# Patient Record
Sex: FEMALE | Race: WHITE | Employment: FULL TIME | URBAN - METROPOLITAN AREA
[De-identification: names, ages, dates, MRNs, and addresses within clinical notes are randomized per-mention and may not be internally consistent; named-entity substitution may affect disease eponyms.]

---

## 2019-03-24 ENCOUNTER — APPOINTMENT (OUTPATIENT)
Dept: CT IMAGING | Age: 34
End: 2019-03-24
Payer: COMMERCIAL

## 2019-03-24 ENCOUNTER — HOSPITAL ENCOUNTER (EMERGENCY)
Age: 34
Discharge: HOME | End: 2019-03-24
Payer: COMMERCIAL

## 2019-03-24 VITALS
WEIGHT: 127 LBS | HEIGHT: 63 IN | TEMPERATURE: 97.8 F | HEART RATE: 89 BPM | BODY MASS INDEX: 22.5 KG/M2 | RESPIRATION RATE: 17 BRPM | DIASTOLIC BLOOD PRESSURE: 80 MMHG | SYSTOLIC BLOOD PRESSURE: 132 MMHG | OXYGEN SATURATION: 100 %

## 2019-03-24 DIAGNOSIS — R10.12 ABDOMINAL PAIN, LEFT UPPER QUADRANT: Primary | ICD-10-CM

## 2019-03-24 LAB
A/G RATIO: 1.2 (ref 1.1–2.2)
ALBUMIN SERPL-MCNC: 4.3 G/DL (ref 3.4–5)
ALP BLD-CCNC: 44 U/L (ref 40–129)
ALT SERPL-CCNC: 20 U/L (ref 10–40)
ANION GAP SERPL CALCULATED.3IONS-SCNC: 11 MMOL/L (ref 3–16)
AST SERPL-CCNC: 22 U/L (ref 15–37)
BACTERIA: ABNORMAL /HPF
BASOPHILS ABSOLUTE: 0 K/UL (ref 0–0.2)
BASOPHILS RELATIVE PERCENT: 0.4 %
BILIRUB SERPL-MCNC: 0.5 MG/DL (ref 0–1)
BILIRUBIN URINE: NEGATIVE
BLOOD, URINE: NEGATIVE
BUN BLDV-MCNC: 10 MG/DL (ref 7–20)
CALCIUM SERPL-MCNC: 9 MG/DL (ref 8.3–10.6)
CHLORIDE BLD-SCNC: 100 MMOL/L (ref 99–110)
CLARITY: CLEAR
CO2: 23 MMOL/L (ref 21–32)
COLOR: YELLOW
CREAT SERPL-MCNC: 0.8 MG/DL (ref 0.6–1.1)
EOSINOPHILS ABSOLUTE: 0 K/UL (ref 0–0.6)
EOSINOPHILS RELATIVE PERCENT: 0.6 %
EPITHELIAL CELLS, UA: ABNORMAL /HPF
GFR AFRICAN AMERICAN: >60
GFR NON-AFRICAN AMERICAN: >60
GLOBULIN: 3.5 G/DL
GLUCOSE BLD-MCNC: 77 MG/DL (ref 70–99)
GLUCOSE URINE: NEGATIVE MG/DL
HCG(URINE) PREGNANCY TEST: NEGATIVE
HCT VFR BLD CALC: 40.6 % (ref 36–48)
HEMOGLOBIN: 13.5 G/DL (ref 12–16)
KETONES, URINE: NEGATIVE MG/DL
LEUKOCYTE ESTERASE, URINE: ABNORMAL
LYMPHOCYTES ABSOLUTE: 2.3 K/UL (ref 1–5.1)
LYMPHOCYTES RELATIVE PERCENT: 42.1 %
MCH RBC QN AUTO: 27.2 PG (ref 26–34)
MCHC RBC AUTO-ENTMCNC: 33.2 G/DL (ref 31–36)
MCV RBC AUTO: 82.1 FL (ref 80–100)
MICROSCOPIC EXAMINATION: YES
MONOCYTES ABSOLUTE: 0.3 K/UL (ref 0–1.3)
MONOCYTES RELATIVE PERCENT: 6.1 %
NEUTROPHILS ABSOLUTE: 2.7 K/UL (ref 1.7–7.7)
NEUTROPHILS RELATIVE PERCENT: 50.8 %
NITRITE, URINE: NEGATIVE
PDW BLD-RTO: 16 % (ref 12.4–15.4)
PH UA: 7 (ref 5–8)
PLATELET # BLD: 263 K/UL (ref 135–450)
PMV BLD AUTO: 7.4 FL (ref 5–10.5)
POTASSIUM REFLEX MAGNESIUM: 4 MMOL/L (ref 3.5–5.1)
PROTEIN UA: NEGATIVE MG/DL
RBC # BLD: 4.94 M/UL (ref 4–5.2)
RBC UA: ABNORMAL /HPF (ref 0–2)
SODIUM BLD-SCNC: 134 MMOL/L (ref 136–145)
SPECIFIC GRAVITY UA: 1.01 (ref 1–1.03)
TOTAL PROTEIN: 7.8 G/DL (ref 6.4–8.2)
URINE REFLEX TO CULTURE: YES
URINE TYPE: ABNORMAL
UROBILINOGEN, URINE: 0.2 E.U./DL
WBC # BLD: 5.4 K/UL (ref 4–11)
WBC UA: ABNORMAL /HPF (ref 0–5)

## 2019-03-24 PROCEDURE — 85025 COMPLETE CBC W/AUTO DIFF WBC: CPT

## 2019-03-24 PROCEDURE — 87086 URINE CULTURE/COLONY COUNT: CPT

## 2019-03-24 PROCEDURE — 99284 EMERGENCY DEPT VISIT MOD MDM: CPT

## 2019-03-24 PROCEDURE — 80053 COMPREHEN METABOLIC PANEL: CPT

## 2019-03-24 PROCEDURE — 74177 CT ABD & PELVIS W/CONTRAST: CPT

## 2019-03-24 PROCEDURE — 84703 CHORIONIC GONADOTROPIN ASSAY: CPT

## 2019-03-24 PROCEDURE — 81001 URINALYSIS AUTO W/SCOPE: CPT

## 2019-03-24 PROCEDURE — 6360000004 HC RX CONTRAST MEDICATION: Performed by: PHYSICIAN ASSISTANT

## 2019-03-24 RX ORDER — IBUPROFEN 600 MG/1
600 TABLET ORAL EVERY 8 HOURS PRN
Qty: 20 TABLET | Refills: 0 | Status: SHIPPED | OUTPATIENT
Start: 2019-03-24 | End: 2020-01-20

## 2019-03-24 RX ADMIN — IOPAMIDOL 75 ML: 755 INJECTION, SOLUTION INTRAVENOUS at 17:35

## 2019-03-24 ASSESSMENT — PAIN DESCRIPTION - ORIENTATION: ORIENTATION: LEFT

## 2019-03-24 ASSESSMENT — PAIN DESCRIPTION - PAIN TYPE: TYPE: ACUTE PAIN

## 2019-03-24 ASSESSMENT — PAIN SCALES - GENERAL: PAINLEVEL_OUTOF10: 5

## 2019-03-24 ASSESSMENT — PAIN DESCRIPTION - LOCATION: LOCATION: RIB CAGE

## 2019-03-24 ASSESSMENT — PAIN DESCRIPTION - DESCRIPTORS: DESCRIPTORS: STABBING

## 2019-03-26 LAB — URINE CULTURE, ROUTINE: NORMAL

## 2020-01-20 ENCOUNTER — HOSPITAL ENCOUNTER (EMERGENCY)
Age: 35
Discharge: HOME | End: 2020-01-20
Attending: FAMILY MEDICINE

## 2020-01-20 VITALS
DIASTOLIC BLOOD PRESSURE: 81 MMHG | HEART RATE: 94 BPM | HEIGHT: 63 IN | TEMPERATURE: 99.1 F | OXYGEN SATURATION: 100 % | BODY MASS INDEX: 23.04 KG/M2 | SYSTOLIC BLOOD PRESSURE: 100 MMHG | RESPIRATION RATE: 18 BRPM | WEIGHT: 130 LBS

## 2020-01-20 LAB
A/G RATIO: 1.7 (ref 1.1–2.2)
ALBUMIN SERPL-MCNC: 3.7 G/DL (ref 3.4–5)
ALP BLD-CCNC: 34 U/L (ref 40–129)
ALT SERPL-CCNC: 13 U/L (ref 10–40)
AMORPHOUS: ABNORMAL /HPF
ANION GAP SERPL CALCULATED.3IONS-SCNC: 8 MMOL/L (ref 3–16)
AST SERPL-CCNC: 13 U/L (ref 15–37)
BACTERIA: ABNORMAL /HPF
BASOPHILS ABSOLUTE: 0 K/UL (ref 0–0.2)
BASOPHILS RELATIVE PERCENT: 0.2 %
BILIRUB SERPL-MCNC: 0.6 MG/DL (ref 0–1)
BILIRUBIN URINE: NEGATIVE
BLOOD, URINE: NEGATIVE
BUN BLDV-MCNC: 7 MG/DL (ref 7–20)
CALCIUM SERPL-MCNC: 8.2 MG/DL (ref 8.3–10.6)
CHLORIDE BLD-SCNC: 104 MMOL/L (ref 99–110)
CLARITY: CLEAR
CO2: 19 MMOL/L (ref 21–32)
COLOR: YELLOW
CREAT SERPL-MCNC: 0.6 MG/DL (ref 0.6–1.1)
EOSINOPHILS ABSOLUTE: 0 K/UL (ref 0–0.6)
EOSINOPHILS RELATIVE PERCENT: 0 %
EPITHELIAL CELLS, UA: ABNORMAL /HPF
GFR AFRICAN AMERICAN: >60
GFR NON-AFRICAN AMERICAN: >60
GLOBULIN: 2.2 G/DL
GLUCOSE BLD-MCNC: 99 MG/DL (ref 70–99)
GLUCOSE URINE: NEGATIVE MG/DL
HCT VFR BLD CALC: 42.4 % (ref 36–48)
HEMOGLOBIN: 14.2 G/DL (ref 12–16)
KETONES, URINE: 40 MG/DL
LEUKOCYTE ESTERASE, URINE: ABNORMAL
LYMPHOCYTES ABSOLUTE: 0.6 K/UL (ref 1–5.1)
LYMPHOCYTES RELATIVE PERCENT: 8.2 %
MCH RBC QN AUTO: 29 PG (ref 26–34)
MCHC RBC AUTO-ENTMCNC: 33.5 G/DL (ref 31–36)
MCV RBC AUTO: 86.5 FL (ref 80–100)
MICROSCOPIC EXAMINATION: YES
MONOCYTES ABSOLUTE: 0.2 K/UL (ref 0–1.3)
MONOCYTES RELATIVE PERCENT: 3 %
NEUTROPHILS ABSOLUTE: 6.7 K/UL (ref 1.7–7.7)
NEUTROPHILS RELATIVE PERCENT: 88.6 %
NITRITE, URINE: NEGATIVE
PDW BLD-RTO: 14 % (ref 12.4–15.4)
PH UA: 5.5 (ref 5–8)
PLATELET # BLD: 228 K/UL (ref 135–450)
PLATELET SLIDE REVIEW: ADEQUATE
PMV BLD AUTO: 8.1 FL (ref 5–10.5)
POTASSIUM SERPL-SCNC: 3.8 MMOL/L (ref 3.5–5.1)
PROTEIN UA: NEGATIVE MG/DL
RAPID INFLUENZA  B AGN: NEGATIVE
RAPID INFLUENZA A AGN: NEGATIVE
RBC # BLD: 4.9 M/UL (ref 4–5.2)
RBC UA: ABNORMAL /HPF (ref 0–2)
SLIDE REVIEW: ABNORMAL
SODIUM BLD-SCNC: 131 MMOL/L (ref 136–145)
SPECIFIC GRAVITY UA: 1.01 (ref 1–1.03)
TOTAL PROTEIN: 5.9 G/DL (ref 6.4–8.2)
URINE REFLEX TO CULTURE: YES
URINE TYPE: ABNORMAL
UROBILINOGEN, URINE: 0.2 E.U./DL
WBC # BLD: 7.5 K/UL (ref 4–11)
WBC UA: ABNORMAL /HPF (ref 0–5)

## 2020-01-20 PROCEDURE — 96365 THER/PROPH/DIAG IV INF INIT: CPT

## 2020-01-20 PROCEDURE — 81001 URINALYSIS AUTO W/SCOPE: CPT

## 2020-01-20 PROCEDURE — 87804 INFLUENZA ASSAY W/OPTIC: CPT

## 2020-01-20 PROCEDURE — 87086 URINE CULTURE/COLONY COUNT: CPT

## 2020-01-20 PROCEDURE — 80053 COMPREHEN METABOLIC PANEL: CPT

## 2020-01-20 PROCEDURE — 96375 TX/PRO/DX INJ NEW DRUG ADDON: CPT

## 2020-01-20 PROCEDURE — 2580000003 HC RX 258: Performed by: FAMILY MEDICINE

## 2020-01-20 PROCEDURE — 6360000002 HC RX W HCPCS: Performed by: FAMILY MEDICINE

## 2020-01-20 PROCEDURE — 99283 EMERGENCY DEPT VISIT LOW MDM: CPT

## 2020-01-20 PROCEDURE — 85025 COMPLETE CBC W/AUTO DIFF WBC: CPT

## 2020-01-20 RX ORDER — ONDANSETRON 2 MG/ML
8 INJECTION INTRAMUSCULAR; INTRAVENOUS ONCE
Status: COMPLETED | OUTPATIENT
Start: 2020-01-20 | End: 2020-01-20

## 2020-01-20 RX ORDER — 0.9 % SODIUM CHLORIDE 0.9 %
1000 INTRAVENOUS SOLUTION INTRAVENOUS ONCE
Status: COMPLETED | OUTPATIENT
Start: 2020-01-20 | End: 2020-01-20

## 2020-01-20 RX ORDER — DEXTROSE AND SODIUM CHLORIDE 5; .9 G/100ML; G/100ML
1000 INJECTION, SOLUTION INTRAVENOUS ONCE
Status: COMPLETED | OUTPATIENT
Start: 2020-01-20 | End: 2020-01-20

## 2020-01-20 RX ORDER — ONDANSETRON 4 MG/1
4 TABLET, FILM COATED ORAL 3 TIMES DAILY PRN
Qty: 15 TABLET | Refills: 0 | Status: SHIPPED | OUTPATIENT
Start: 2020-01-20 | End: 2021-12-17

## 2020-01-20 RX ADMIN — DEXTROSE AND SODIUM CHLORIDE 1000 ML: 5; 900 INJECTION, SOLUTION INTRAVENOUS at 02:44

## 2020-01-20 RX ADMIN — SODIUM CHLORIDE 1000 ML: 9 INJECTION, SOLUTION INTRAVENOUS at 01:08

## 2020-01-20 RX ADMIN — ONDANSETRON HYDROCHLORIDE 8 MG: 2 INJECTION, SOLUTION INTRAMUSCULAR; INTRAVENOUS at 01:08

## 2020-01-20 NOTE — ED PROVIDER NOTES
Rash       SOCIAL AND FAMILY HISTORY    Social History     Socioeconomic History    Marital status: Single     Spouse name: None    Number of children: None    Years of education: None    Highest education level: None   Occupational History    None   Social Needs    Financial resource strain: None    Food insecurity:     Worry: None     Inability: None    Transportation needs:     Medical: None     Non-medical: None   Tobacco Use    Smoking status: Never Smoker    Smokeless tobacco: Never Used   Substance and Sexual Activity    Alcohol use: Not Currently    Drug use: Yes     Types: Marijuana    Sexual activity: None   Lifestyle    Physical activity:     Days per week: None     Minutes per session: None    Stress: None   Relationships    Social connections:     Talks on phone: None     Gets together: None     Attends [employer] service: None     Active member of club or organization: None     Attends meetings of clubs or organizations: None     Relationship status: None    Intimate partner violence:     Fear of current or ex partner: None     Emotionally abused: None     Physically abused: None     Forced sexual activity: None   Other Topics Concern    None   Social History Narrative    None     History reviewed. No pertinent family history.     PHYSICAL EXAM    VITAL SIGNS: /81   Pulse 94   Temp 99.1 °F (37.3 °C) (Oral)   Resp 18   Ht 5' 3\" (1.6 m)   Wt 130 lb (59 kg)   LMP 12/26/2018   SpO2 100%   BMI 23.03 kg/m²   Constitutional:  Well developed, well nourished, no acute distress  Eyes:  Sclera nonicteric, conjunctiva moist  HENT:  Atraumatic, nose normal  Neck: Supple, no JVD  Respiratory:  Lungs clear to auscultation bilaterally, no retractions, no accessory muscle use  Cardiovascular:  Mildly tachycardic initially , normal rhythm, no murmurs  GI:  Soft, no abdominal tenderness, no McBurney's point tenderness, no guarding, bowel sounds present, no audible bruits or palpable pulsatile masses. Back: no CVA tenderness  Musculoskeletal:  No edema, no acute deformities  Integument: No rash, dry skin  Neurologic:  Alert & oriented, no slurred speech    RADIOLOGY    No orders to display       ED COURSE & MEDICAL DECISION MAKING    Pertinent Labs reviewed and interpreted. (See chart for details)   See chart for details of medications given during the ED stay. Vitals:    01/20/20 0123 01/20/20 0342   BP: 111/68 100/81   Pulse: 105 94   Resp: 16 18   Temp: 99.1 °F (37.3 °C)    TempSrc: Oral    SpO2: 98% 100%   Weight: 130 lb (59 kg)    Height: 5' 3\" (1.6 m)        Differential diagnosis: Dehydration, Bacterial vs Viral GI illness, Ischemic Bowel, Bowel Obstruction, Acute Cholecystitis, Acute Appendicitis, Acute Pancreatitis, Diverticulitis, Pyelonephritis, UTI, STD, Gonad Torsion, other    Patient is afebrile and nontoxic in appearance. no tenderness on abdominal exam.  Labs reveal no leukocytosis or anemia. Metabolic panel unremarkable. Urinalysis unremarkable. Influenza a and B are negative. .    Reevaluation at 0300: Patient is resting comfortably. Patient tolerated p.o. challenge. I believe the patient is safe for discharge at this time. I have low suspicion for acute intra-abdominal pathology at this time. I'll prescribe symptomatic medications and recommend outpatient follow-up. The patient was instructed to follow up as an outpatient in 2 days. The patient was instructed to return to the ED immediately for any new or worsening symptoms. The patient verbalized understanding. FINAL IMPRESSION    1.  Nausea vomiting and diarrhea        PLAN  Discharge with outpatient follow-up      (Please note that this note was completed with a voice recognition program.  Every attempt was made to edit the dictations, but inevitably there remain words that are mis-transcribed.)        Ara Melchor MD  01/20/20 Johanne Leiva MD  01/21/20 0045

## 2020-01-20 NOTE — ED NOTES
25-Sep-2017 00:00 Saline lock removed intact. IV site looked unremarkable. Pressure dressing applied. Discharge instructions reviewed with Ms. Reji Bose. She verbalized understanding. Copy of discharge instructions and prescriptions given. Ms. Reji Bose was discharged to home in good condition per personal vehicle, family driving. She exited the ED without difficulty.         Bolivar Jaramillo RN  01/20/20 8852

## 2020-01-21 LAB — URINE CULTURE, ROUTINE: NORMAL

## 2020-02-18 LAB
ABO, EXTERNAL RESULT: NORMAL
HEP B, EXTERNAL RESULT: NEGATIVE
HEPATITIS C ANTIBODY, EXTERNAL RESULT: NEGATIVE
HIV, EXTERNAL RESULT: NON REACTIVE
RH FACTOR, EXTERNAL RESULT: NEGATIVE
RPR, EXTERNAL RESULT: NON REACTIVE
RUBELLA TITER, EXTERNAL RESULT: NORMAL

## 2020-04-30 LAB
C. TRACHOMATIS, EXTERNAL RESULT: NEGATIVE
N. GONORRHOEAE, EXTERNAL RESULT: NEGATIVE

## 2020-06-24 LAB — RPR, EXTERNAL RESULT: REACTIVE

## 2020-09-01 ENCOUNTER — HOSPITAL ENCOUNTER (INPATIENT)
Age: 35
LOS: 3 days | Discharge: HOME | End: 2020-09-04
Attending: OBSTETRICS & GYNECOLOGY | Admitting: OBSTETRICS & GYNECOLOGY
Payer: COMMERCIAL

## 2020-09-01 PROBLEM — N97.9 FEMALE FERTILITY PROBLEM: Status: ACTIVE | Noted: 2020-09-01

## 2020-09-01 PROBLEM — O36.5930 POOR FETAL GROWTH AFFECTING MANAGEMENT OF MOTHER IN THIRD TRIMESTER: Status: ACTIVE | Noted: 2020-09-01

## 2020-09-01 PROBLEM — O13.3 GESTATIONAL HYPERTENSION, THIRD TRIMESTER: Status: ACTIVE | Noted: 2020-09-01

## 2020-09-01 PROBLEM — Z67.91 RH NEGATIVE STATUS DURING PREGNANCY IN THIRD TRIMESTER: Status: ACTIVE | Noted: 2020-09-01

## 2020-09-01 PROBLEM — O26.893 RH NEGATIVE STATUS DURING PREGNANCY IN THIRD TRIMESTER: Status: ACTIVE | Noted: 2020-09-01

## 2020-09-01 PROBLEM — R76.8 BIOLOGICAL FALSE POSITIVE RPR TEST: Status: ACTIVE | Noted: 2020-09-01

## 2020-09-01 PROBLEM — E23.0: Status: ACTIVE | Noted: 2020-09-01

## 2020-09-01 PROBLEM — E23.0 HYPOPITUITARISM (HCC): Status: ACTIVE | Noted: 2020-09-01

## 2020-09-01 LAB
A/G RATIO: 1.2 (ref 1.1–2.2)
ABO/RH: NORMAL
ALBUMIN SERPL-MCNC: 3.3 G/DL (ref 3.4–5)
ALP BLD-CCNC: 103 U/L (ref 40–129)
ALT SERPL-CCNC: 37 U/L (ref 10–40)
AMPHETAMINE SCREEN, URINE: NORMAL
ANION GAP SERPL CALCULATED.3IONS-SCNC: 11 MMOL/L (ref 3–16)
ANTIBODY IDENTIFICATION: NORMAL
ANTIBODY SCREEN: NORMAL
AST SERPL-CCNC: 24 U/L (ref 15–37)
BARBITURATE SCREEN URINE: NORMAL
BASOPHILS ABSOLUTE: 0 K/UL (ref 0–0.2)
BASOPHILS RELATIVE PERCENT: 0.2 %
BENZODIAZEPINE SCREEN, URINE: NORMAL
BILIRUB SERPL-MCNC: <0.2 MG/DL (ref 0–1)
BILIRUBIN URINE: NEGATIVE
BLOOD, URINE: NEGATIVE
BUN BLDV-MCNC: 10 MG/DL (ref 7–20)
BUPRENORPHINE URINE: NORMAL
CALCIUM SERPL-MCNC: 8.9 MG/DL (ref 8.3–10.6)
CANNABINOID SCREEN URINE: NORMAL
CHLORIDE BLD-SCNC: 107 MMOL/L (ref 99–110)
CLARITY: CLEAR
CO2: 19 MMOL/L (ref 21–32)
COCAINE METABOLITE SCREEN URINE: NORMAL
COLOR: YELLOW
CREAT SERPL-MCNC: <0.5 MG/DL (ref 0.6–1.1)
CREATININE URINE: 108.6 MG/DL (ref 28–259)
DAT IGG CAPTURE: NORMAL
EOSINOPHILS ABSOLUTE: 0 K/UL (ref 0–0.6)
EOSINOPHILS RELATIVE PERCENT: 0.4 %
GFR AFRICAN AMERICAN: >60
GFR NON-AFRICAN AMERICAN: >60
GLOBULIN: 2.7 G/DL
GLUCOSE BLD-MCNC: 106 MG/DL (ref 70–99)
GLUCOSE URINE: NEGATIVE MG/DL
HCT VFR BLD CALC: 36.5 % (ref 36–48)
HEMOGLOBIN: 12.4 G/DL (ref 12–16)
KETONES, URINE: NEGATIVE MG/DL
LEUKOCYTE ESTERASE, URINE: NEGATIVE
LYMPHOCYTES ABSOLUTE: 1.8 K/UL (ref 1–5.1)
LYMPHOCYTES RELATIVE PERCENT: 25 %
Lab: NORMAL
MCH RBC QN AUTO: 31.3 PG (ref 26–34)
MCHC RBC AUTO-ENTMCNC: 34.1 G/DL (ref 31–36)
MCV RBC AUTO: 91.7 FL (ref 80–100)
METHADONE SCREEN, URINE: NORMAL
MICROSCOPIC EXAMINATION: NORMAL
MONOCYTES ABSOLUTE: 0.4 K/UL (ref 0–1.3)
MONOCYTES RELATIVE PERCENT: 5.6 %
NEUTROPHILS ABSOLUTE: 5.1 K/UL (ref 1.7–7.7)
NEUTROPHILS RELATIVE PERCENT: 68.8 %
NITRITE, URINE: NEGATIVE
OPIATE SCREEN URINE: NORMAL
OXYCODONE URINE: NORMAL
PDW BLD-RTO: 13.7 % (ref 12.4–15.4)
PH UA: 6
PH UA: 6 (ref 5–8)
PHENCYCLIDINE SCREEN URINE: NORMAL
PLATELET # BLD: 217 K/UL (ref 135–450)
PMV BLD AUTO: 7.8 FL (ref 5–10.5)
POTASSIUM SERPL-SCNC: 3.8 MMOL/L (ref 3.5–5.1)
PROPOXYPHENE SCREEN: NORMAL
PROTEIN PROTEIN: 25 MG/DL
PROTEIN UA: NEGATIVE MG/DL
PROTEIN/CREAT RATIO: 0.2 MG/DL
RBC # BLD: 3.98 M/UL (ref 4–5.2)
SODIUM BLD-SCNC: 137 MMOL/L (ref 136–145)
SPECIFIC GRAVITY UA: 1.02 (ref 1–1.03)
T4 FREE: 1 NG/DL (ref 0.9–1.8)
TOTAL PROTEIN: 6 G/DL (ref 6.4–8.2)
TSH REFLEX: 1.03 UIU/ML (ref 0.27–4.2)
URIC ACID, SERUM: 4 MG/DL (ref 2.6–6)
URINE TYPE: NORMAL
UROBILINOGEN, URINE: 0.2 E.U./DL
WBC # BLD: 7.4 K/UL (ref 4–11)

## 2020-09-01 PROCEDURE — 82570 ASSAY OF URINE CREATININE: CPT

## 2020-09-01 PROCEDURE — 1220000000 HC SEMI PRIVATE OB R&B

## 2020-09-01 PROCEDURE — 81003 URINALYSIS AUTO W/O SCOPE: CPT

## 2020-09-01 PROCEDURE — 80307 DRUG TEST PRSMV CHEM ANLYZR: CPT

## 2020-09-01 PROCEDURE — 86900 BLOOD TYPING SEROLOGIC ABO: CPT

## 2020-09-01 PROCEDURE — 86850 RBC ANTIBODY SCREEN: CPT

## 2020-09-01 PROCEDURE — 85025 COMPLETE CBC W/AUTO DIFF WBC: CPT

## 2020-09-01 PROCEDURE — 84156 ASSAY OF PROTEIN URINE: CPT

## 2020-09-01 PROCEDURE — 84550 ASSAY OF BLOOD/URIC ACID: CPT

## 2020-09-01 PROCEDURE — 86870 RBC ANTIBODY IDENTIFICATION: CPT

## 2020-09-01 PROCEDURE — 84443 ASSAY THYROID STIM HORMONE: CPT

## 2020-09-01 PROCEDURE — 80053 COMPREHEN METABOLIC PANEL: CPT

## 2020-09-01 PROCEDURE — 86780 TREPONEMA PALLIDUM: CPT

## 2020-09-01 PROCEDURE — 84439 ASSAY OF FREE THYROXINE: CPT

## 2020-09-01 PROCEDURE — 86901 BLOOD TYPING SEROLOGIC RH(D): CPT

## 2020-09-01 PROCEDURE — 86880 COOMBS TEST DIRECT: CPT

## 2020-09-01 RX ORDER — DOCUSATE SODIUM 100 MG/1
100 CAPSULE, LIQUID FILLED ORAL 2 TIMES DAILY PRN
Status: DISCONTINUED | OUTPATIENT
Start: 2020-09-01 | End: 2020-09-02

## 2020-09-01 RX ORDER — BUTORPHANOL TARTRATE 1 MG/ML
1 INJECTION, SOLUTION INTRAMUSCULAR; INTRAVENOUS
Status: DISCONTINUED | OUTPATIENT
Start: 2020-09-01 | End: 2020-09-02

## 2020-09-01 RX ORDER — NALBUPHINE HCL 10 MG/ML
10 AMPUL (ML) INJECTION
Status: DISCONTINUED | OUTPATIENT
Start: 2020-09-01 | End: 2020-09-02

## 2020-09-01 RX ORDER — SODIUM CHLORIDE, SODIUM LACTATE, POTASSIUM CHLORIDE, CALCIUM CHLORIDE 600; 310; 30; 20 MG/100ML; MG/100ML; MG/100ML; MG/100ML
INJECTION, SOLUTION INTRAVENOUS CONTINUOUS
Status: DISCONTINUED | OUTPATIENT
Start: 2020-09-01 | End: 2020-09-02

## 2020-09-01 RX ORDER — MULTIVIT-MIN/FOLIC/VIT K/LYCOP 400-300MCG
1 TABLET ORAL DAILY
COMMUNITY

## 2020-09-01 RX ORDER — LEVOTHYROXINE SODIUM 50 UG/1
1 TABLET ORAL DAILY
COMMUNITY
Start: 2020-08-22 | End: 2021-12-17 | Stop reason: ALTCHOICE

## 2020-09-01 RX ORDER — ONDANSETRON 2 MG/ML
4 INJECTION INTRAMUSCULAR; INTRAVENOUS EVERY 6 HOURS PRN
Status: DISCONTINUED | OUTPATIENT
Start: 2020-09-01 | End: 2020-09-02

## 2020-09-01 RX ORDER — SODIUM CHLORIDE, SODIUM LACTATE, POTASSIUM CHLORIDE, CALCIUM CHLORIDE 600; 310; 30; 20 MG/100ML; MG/100ML; MG/100ML; MG/100ML
INJECTION, SOLUTION INTRAVENOUS
Status: COMPLETED
Start: 2020-09-01 | End: 2020-09-02

## 2020-09-01 RX ORDER — SODIUM CHLORIDE 0.9 % (FLUSH) 0.9 %
10 SYRINGE (ML) INJECTION PRN
Status: DISCONTINUED | OUTPATIENT
Start: 2020-09-01 | End: 2020-09-02

## 2020-09-01 RX ORDER — SODIUM CHLORIDE 0.9 % (FLUSH) 0.9 %
10 SYRINGE (ML) INJECTION EVERY 12 HOURS SCHEDULED
Status: DISCONTINUED | OUTPATIENT
Start: 2020-09-01 | End: 2020-09-02

## 2020-09-01 NOTE — FLOWSHEET NOTE
Spoke with Dr. Jayden Hoff regarding BPs 130s/80s. Labs WNL. PCR 0.2. Orders to admit patient for observation overnight.

## 2020-09-02 ENCOUNTER — ANESTHESIA (OUTPATIENT)
Dept: LABOR AND DELIVERY | Age: 35
End: 2020-09-02
Payer: COMMERCIAL

## 2020-09-02 ENCOUNTER — ANESTHESIA EVENT (OUTPATIENT)
Dept: LABOR AND DELIVERY | Age: 35
End: 2020-09-02
Payer: COMMERCIAL

## 2020-09-02 VITALS — DIASTOLIC BLOOD PRESSURE: 65 MMHG | SYSTOLIC BLOOD PRESSURE: 118 MMHG | OXYGEN SATURATION: 98 %

## 2020-09-02 PROBLEM — Z98.891 S/P CESAREAN SECTION: Status: ACTIVE | Noted: 2020-09-02

## 2020-09-02 LAB
SARS-COV-2, NAAT: NOT DETECTED
TOTAL SYPHILLIS IGG/IGM: NORMAL

## 2020-09-02 PROCEDURE — 7100000001 HC PACU RECOVERY - ADDTL 15 MIN: Performed by: OBSTETRICS & GYNECOLOGY

## 2020-09-02 PROCEDURE — 6360000002 HC RX W HCPCS: Performed by: NURSE ANESTHETIST, CERTIFIED REGISTERED

## 2020-09-02 PROCEDURE — 6370000000 HC RX 637 (ALT 250 FOR IP): Performed by: OBSTETRICS & GYNECOLOGY

## 2020-09-02 PROCEDURE — 6360000002 HC RX W HCPCS: Performed by: OBSTETRICS & GYNECOLOGY

## 2020-09-02 PROCEDURE — 2500000003 HC RX 250 WO HCPCS: Performed by: NURSE ANESTHETIST, CERTIFIED REGISTERED

## 2020-09-02 PROCEDURE — 10907ZC DRAINAGE OF AMNIOTIC FLUID, THERAPEUTIC FROM PRODUCTS OF CONCEPTION, VIA NATURAL OR ARTIFICIAL OPENING: ICD-10-PCS | Performed by: OBSTETRICS & GYNECOLOGY

## 2020-09-02 PROCEDURE — 2580000003 HC RX 258

## 2020-09-02 PROCEDURE — 7100000000 HC PACU RECOVERY - FIRST 15 MIN: Performed by: OBSTETRICS & GYNECOLOGY

## 2020-09-02 PROCEDURE — 3700000001 HC ADD 15 MINUTES (ANESTHESIA): Performed by: OBSTETRICS & GYNECOLOGY

## 2020-09-02 PROCEDURE — 1220000000 HC SEMI PRIVATE OB R&B

## 2020-09-02 PROCEDURE — 2580000003 HC RX 258: Performed by: OBSTETRICS & GYNECOLOGY

## 2020-09-02 PROCEDURE — 3700000025 EPIDURAL BLOCK: Performed by: ANESTHESIOLOGY

## 2020-09-02 PROCEDURE — 3609079900 HC CESAREAN SECTION: Performed by: OBSTETRICS & GYNECOLOGY

## 2020-09-02 PROCEDURE — U0002 COVID-19 LAB TEST NON-CDC: HCPCS

## 2020-09-02 PROCEDURE — 88307 TISSUE EXAM BY PATHOLOGIST: CPT

## 2020-09-02 PROCEDURE — 2709999900 HC NON-CHARGEABLE SUPPLY: Performed by: OBSTETRICS & GYNECOLOGY

## 2020-09-02 PROCEDURE — 10H07YZ INSERTION OF OTHER DEVICE INTO PRODUCTS OF CONCEPTION, VIA NATURAL OR ARTIFICIAL OPENING: ICD-10-PCS | Performed by: OBSTETRICS & GYNECOLOGY

## 2020-09-02 PROCEDURE — 3700000000 HC ANESTHESIA ATTENDED CARE: Performed by: OBSTETRICS & GYNECOLOGY

## 2020-09-02 RX ORDER — DIPHENHYDRAMINE HYDROCHLORIDE 50 MG/ML
25 INJECTION INTRAMUSCULAR; INTRAVENOUS EVERY 6 HOURS PRN
Status: DISCONTINUED | OUTPATIENT
Start: 2020-09-02 | End: 2020-09-04 | Stop reason: HOSPADM

## 2020-09-02 RX ORDER — OXYTOCIN 10 [USP'U]/ML
INJECTION, SOLUTION INTRAMUSCULAR; INTRAVENOUS PRN
Status: DISCONTINUED | OUTPATIENT
Start: 2020-09-02 | End: 2020-09-02 | Stop reason: SDUPTHER

## 2020-09-02 RX ORDER — OXYCODONE HYDROCHLORIDE 5 MG/1
10 TABLET ORAL EVERY 4 HOURS PRN
Status: DISCONTINUED | OUTPATIENT
Start: 2020-09-02 | End: 2020-09-04 | Stop reason: HOSPADM

## 2020-09-02 RX ORDER — AZITHROMYCIN 500 MG/1
INJECTION, POWDER, LYOPHILIZED, FOR SOLUTION INTRAVENOUS
Status: DISCONTINUED
Start: 2020-09-02 | End: 2020-09-02

## 2020-09-02 RX ORDER — MORPHINE SULFATE 10 MG/ML
INJECTION, SOLUTION INTRAMUSCULAR; INTRAVENOUS PRN
Status: DISCONTINUED | OUTPATIENT
Start: 2020-09-02 | End: 2020-09-02 | Stop reason: SDUPTHER

## 2020-09-02 RX ORDER — LIDOCAINE HYDROCHLORIDE 20 MG/ML
INJECTION, SOLUTION EPIDURAL; INFILTRATION; INTRACAUDAL; PERINEURAL PRN
Status: DISCONTINUED | OUTPATIENT
Start: 2020-09-02 | End: 2020-09-02 | Stop reason: SDUPTHER

## 2020-09-02 RX ORDER — EPHEDRINE SULFATE 50 MG/ML
INJECTION INTRAVENOUS PRN
Status: DISCONTINUED | OUTPATIENT
Start: 2020-09-02 | End: 2020-09-02 | Stop reason: SDUPTHER

## 2020-09-02 RX ORDER — SODIUM CHLORIDE 0.9 % (FLUSH) 0.9 %
10 SYRINGE (ML) INJECTION EVERY 12 HOURS SCHEDULED
Status: DISCONTINUED | OUTPATIENT
Start: 2020-09-02 | End: 2020-09-04 | Stop reason: HOSPADM

## 2020-09-02 RX ORDER — ONDANSETRON 2 MG/ML
INJECTION INTRAMUSCULAR; INTRAVENOUS
Status: COMPLETED
Start: 2020-09-02 | End: 2020-09-02

## 2020-09-02 RX ORDER — MORPHINE SULFATE 0.5 MG/ML
INJECTION, SOLUTION EPIDURAL; INTRATHECAL; INTRAVENOUS
Status: DISCONTINUED
Start: 2020-09-02 | End: 2020-09-02

## 2020-09-02 RX ORDER — IBUPROFEN 800 MG/1
800 TABLET ORAL EVERY 8 HOURS SCHEDULED
Status: DISCONTINUED | OUTPATIENT
Start: 2020-09-02 | End: 2020-09-04 | Stop reason: HOSPADM

## 2020-09-02 RX ORDER — DOCUSATE SODIUM 100 MG/1
100 CAPSULE, LIQUID FILLED ORAL 2 TIMES DAILY
Status: DISCONTINUED | OUTPATIENT
Start: 2020-09-02 | End: 2020-09-04 | Stop reason: HOSPADM

## 2020-09-02 RX ORDER — OXYCODONE HYDROCHLORIDE 5 MG/1
5 TABLET ORAL EVERY 4 HOURS PRN
Status: DISCONTINUED | OUTPATIENT
Start: 2020-09-02 | End: 2020-09-04 | Stop reason: HOSPADM

## 2020-09-02 RX ORDER — KETOROLAC TROMETHAMINE 30 MG/ML
30 INJECTION, SOLUTION INTRAMUSCULAR; INTRAVENOUS EVERY 8 HOURS
Status: DISCONTINUED | OUTPATIENT
Start: 2020-09-02 | End: 2020-09-04

## 2020-09-02 RX ORDER — LEVOTHYROXINE SODIUM 0.03 MG/1
50 TABLET ORAL DAILY
Status: DISCONTINUED | OUTPATIENT
Start: 2020-09-02 | End: 2020-09-04 | Stop reason: HOSPADM

## 2020-09-02 RX ORDER — SODIUM CHLORIDE, SODIUM LACTATE, POTASSIUM CHLORIDE, CALCIUM CHLORIDE 600; 310; 30; 20 MG/100ML; MG/100ML; MG/100ML; MG/100ML
INJECTION, SOLUTION INTRAVENOUS CONTINUOUS
Status: DISCONTINUED | OUTPATIENT
Start: 2020-09-02 | End: 2020-09-04 | Stop reason: HOSPADM

## 2020-09-02 RX ORDER — ACETAMINOPHEN 500 MG
1000 TABLET ORAL EVERY 8 HOURS SCHEDULED
Status: DISCONTINUED | OUTPATIENT
Start: 2020-09-02 | End: 2020-09-04 | Stop reason: HOSPADM

## 2020-09-02 RX ORDER — ONDANSETRON 2 MG/ML
INJECTION INTRAMUSCULAR; INTRAVENOUS PRN
Status: DISCONTINUED | OUTPATIENT
Start: 2020-09-02 | End: 2020-09-02 | Stop reason: SDUPTHER

## 2020-09-02 RX ORDER — FERROUS SULFATE 325(65) MG
325 TABLET ORAL 2 TIMES DAILY WITH MEALS
Status: DISCONTINUED | OUTPATIENT
Start: 2020-09-02 | End: 2020-09-04 | Stop reason: HOSPADM

## 2020-09-02 RX ORDER — SIMETHICONE 80 MG
80 TABLET,CHEWABLE ORAL EVERY 6 HOURS PRN
Status: DISCONTINUED | OUTPATIENT
Start: 2020-09-02 | End: 2020-09-04 | Stop reason: HOSPADM

## 2020-09-02 RX ORDER — SODIUM CHLORIDE 0.9 % (FLUSH) 0.9 %
10 SYRINGE (ML) INJECTION PRN
Status: DISCONTINUED | OUTPATIENT
Start: 2020-09-02 | End: 2020-09-04 | Stop reason: HOSPADM

## 2020-09-02 RX ORDER — LANOLIN 100 %
OINTMENT (GRAM) TOPICAL
Status: DISCONTINUED | OUTPATIENT
Start: 2020-09-02 | End: 2020-09-04 | Stop reason: HOSPADM

## 2020-09-02 RX ORDER — ONDANSETRON 2 MG/ML
4 INJECTION INTRAMUSCULAR; INTRAVENOUS EVERY 6 HOURS PRN
Status: DISCONTINUED | OUTPATIENT
Start: 2020-09-02 | End: 2020-09-04 | Stop reason: HOSPADM

## 2020-09-02 RX ADMIN — Medication 15 ML/HR: at 08:58

## 2020-09-02 RX ADMIN — SODIUM CHLORIDE, POTASSIUM CHLORIDE, SODIUM LACTATE AND CALCIUM CHLORIDE: 600; 310; 30; 20 INJECTION, SOLUTION INTRAVENOUS at 14:20

## 2020-09-02 RX ADMIN — Medication 1 MILLI-UNITS/MIN: at 14:33

## 2020-09-02 RX ADMIN — MORPHINE SULFATE 1 MG: 10 INJECTION, SOLUTION INTRAMUSCULAR; INTRAVENOUS at 13:23

## 2020-09-02 RX ADMIN — Medication 25 MCG: at 04:02

## 2020-09-02 RX ADMIN — MORPHINE SULFATE 1 MG: 10 INJECTION, SOLUTION INTRAMUSCULAR; INTRAVENOUS at 13:25

## 2020-09-02 RX ADMIN — SODIUM CHLORIDE, POTASSIUM CHLORIDE, SODIUM LACTATE AND CALCIUM CHLORIDE: 600; 310; 30; 20 INJECTION, SOLUTION INTRAVENOUS at 08:15

## 2020-09-02 RX ADMIN — OXYTOCIN 20 UNITS: 10 INJECTION INTRAVENOUS at 13:24

## 2020-09-02 RX ADMIN — MORPHINE SULFATE 1 MG: 10 INJECTION, SOLUTION INTRAMUSCULAR; INTRAVENOUS at 13:28

## 2020-09-02 RX ADMIN — DOCUSATE SODIUM 100 MG: 100 CAPSULE, LIQUID FILLED ORAL at 21:22

## 2020-09-02 RX ADMIN — LIDOCAINE HYDROCHLORIDE 5 ML: 20 INJECTION, SOLUTION EPIDURAL; INFILTRATION; INTRACAUDAL; PERINEURAL at 12:52

## 2020-09-02 RX ADMIN — SODIUM CHLORIDE, POTASSIUM CHLORIDE, SODIUM LACTATE AND CALCIUM CHLORIDE: 600; 310; 30; 20 INJECTION, SOLUTION INTRAVENOUS at 03:22

## 2020-09-02 RX ADMIN — ONDANSETRON 4 MG: 2 INJECTION INTRAMUSCULAR; INTRAVENOUS at 12:55

## 2020-09-02 RX ADMIN — AZITHROMYCIN MONOHYDRATE 500 MG: 500 INJECTION, POWDER, LYOPHILIZED, FOR SOLUTION INTRAVENOUS at 13:14

## 2020-09-02 RX ADMIN — EPHEDRINE SULFATE 20 MG: 50 INJECTION INTRAVENOUS at 13:29

## 2020-09-02 RX ADMIN — SODIUM CHLORIDE, POTASSIUM CHLORIDE, SODIUM LACTATE AND CALCIUM CHLORIDE: 600; 310; 30; 20 INJECTION, SOLUTION INTRAVENOUS at 09:12

## 2020-09-02 RX ADMIN — LIDOCAINE HYDROCHLORIDE 2 ML: 20 INJECTION, SOLUTION EPIDURAL; INFILTRATION; INTRACAUDAL; PERINEURAL at 09:00

## 2020-09-02 RX ADMIN — LIDOCAINE HYDROCHLORIDE 5 ML: 20 INJECTION, SOLUTION EPIDURAL; INFILTRATION; INTRACAUDAL; PERINEURAL at 12:50

## 2020-09-02 RX ADMIN — Medication 25 MCG: at 00:02

## 2020-09-02 RX ADMIN — CEFAZOLIN 2 G: 10 INJECTION, POWDER, FOR SOLUTION INTRAVENOUS at 12:57

## 2020-09-02 RX ADMIN — KETOROLAC TROMETHAMINE 30 MG: 30 INJECTION, SOLUTION INTRAMUSCULAR at 21:22

## 2020-09-02 RX ADMIN — SODIUM CHLORIDE, POTASSIUM CHLORIDE, SODIUM LACTATE AND CALCIUM CHLORIDE: 600; 310; 30; 20 INJECTION, SOLUTION INTRAVENOUS at 12:14

## 2020-09-02 RX ADMIN — FAMOTIDINE 20 MG: 10 INJECTION, SOLUTION INTRAVENOUS at 12:55

## 2020-09-02 ASSESSMENT — PULMONARY FUNCTION TESTS
PIF_VALUE: 0

## 2020-09-02 ASSESSMENT — PAIN SCALES - GENERAL: PAINLEVEL_OUTOF10: 4

## 2020-09-02 NOTE — H&P
Department of Obstetrics and Gynecology  Labor and Delivery Admit Note        CHIEF COMPLAINT:  elevated blood pressure    HISTORY OF PRESENT ILLNESS:      The patient is a 36 y.o. female 36w7d. OB History        1    Para        Term                AB        Living           SAB        TAB        Ectopic        Molar        Multiple        Live Births                  Patient with increased bp in the office and on L&D over 4hrs apart consistent with GHTN. Intermittent sharp HA starting a few days ago, no vis changes, no ruq pain, no n/v. Pt w/known IUGR, EFW 6%ile with plan originally for IOL at 65EVR if no complications arise. With new onset GHTN, will proceed with 37wk IOL. 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 No pertinent surgical history. SOCIAL HISTORY:     reports that she has never smoked. She has never used smokeless tobacco. She reports previous alcohol use. She reports current drug use. Drug: Marijuana. MEDICATIONS:    Prior to Admission medications    Medication Sig Start Date End Date Taking?  Authorizing Provider   Probiotic Product (PROBIOTIC-10 PO) Take 1 capsule by mouth daily   Yes Historical Provider, MD Berger, Vacc oxycoccus, (CRANBERRY EXTRACT) 200 MG CAPS Take 1 tablet by mouth daily   Yes Historical Provider, MD   EUTHYROX 50 MCG tablet Take 1 tablet by mouth daily 20  Yes Historical Provider, MD   Prenatal Vit-Fe Fumarate-FA (CLASSIC PRENATAL PO) Take 1 tablet by mouth daily   Yes Historical Provider, MD   Misc Natural Products (PROGESTERONE EX) Apply topically    Historical Provider, MD   ondansetron (ZOFRAN) 4 MG tablet Take 1 tablet by mouth 3 times daily as needed for Nausea or Vomiting 20 Lisa Nj MD        PRENATAL CARE:    Complicated by: Margueritte Rear syndrome, hypopituitarism on synthroid    REVIEW OF SYSTEMS:     Pertinent items are noted in HPI.     PHYSICAL EXAM:    Vital Signs:   Vitals:    09/01/20 2146   BP: 129/81   Pulse: 93   Resp: 16   Temp: 99.1 °F (37.3 °C)   BP: (121-141)/(75-92)     General appearance: alert, appears stated age, cooperative and no distress  Head: Normocephalic, without obvious abnormality, atraumatic  Eyes: negative findings: pupils equal, round, reactive to light and accomodation and visual fields full to confrontation  Lungs: clear to auscultation bilaterally  Heart: regular rate and rhythm  Abdomen: Gravid, NT throughout  Neurologic: Grossly normal    Fetal heart rate:  Baseline Heart Rate 130, accelerations:  present  long term variability:  moderate  decelerations:  absent  Cervix:  FT/50/-2     Contraction frequency:  No ctx    Membranes:  Intact    General Labs:      CBC with Differential:    Lab Results   Component Value Date    WBC 7.4 09/01/2020    RBC 3.98 09/01/2020    HGB 12.4 09/01/2020    HCT 36.5 09/01/2020     09/01/2020    MCV 91.7 09/01/2020    MCH 31.3 09/01/2020    MCHC 34.1 09/01/2020    RDW 13.7 09/01/2020    LYMPHOPCT 25.0 09/01/2020    MONOPCT 5.6 09/01/2020    BASOPCT 0.2 09/01/2020    MONOSABS 0.4 09/01/2020    LYMPHSABS 1.8 09/01/2020    EOSABS 0.0 09/01/2020    BASOSABS 0.0 09/01/2020     CMP:    Lab Results   Component Value Date     09/01/2020    K 3.8 09/01/2020    K 4.0 03/24/2019     09/01/2020    CO2 19 09/01/2020    BUN 10 09/01/2020    CREATININE <0.5 09/01/2020    GFRAA >60 09/01/2020    AGRATIO 1.2 09/01/2020    LABGLOM >60 09/01/2020    GLUCOSE 106 09/01/2020    PROT 6.0 09/01/2020    LABALBU 3.3 09/01/2020    CALCIUM 8.9 09/01/2020    BILITOT <0.2 09/01/2020    ALKPHOS 103 09/01/2020    AST 24 09/01/2020    ALT 37 09/01/2020     Uric Acid:    Lab Results   Component Value Date    LABURIC 4.0 2020     U/A:    Lab Results   Component Value Date    COLORU Yellow 2020    PROTEINU Negative 2020    PHUR 6.0 2020    PHUR 6.0 2020    WBCUA 3-5 2020    RBCUA 3-5 2020    BACTERIA 1+ 2020    CLARITYU Clear 2020    SPECGRAV 1.025 2020    LEUKOCYTESUR Negative 2020    UROBILINOGEN 0.2 2020    BILIRUBINUR Negative 2020    BLOODU Negative 2020    GLUCOSEU Negative 2020    AMORPHOUS 1+ 2020     P/C: 0.2    ASSESSMENT/PLAN:    Patient Active Problem List   Diagnosis    Poor fetal growth affecting management of mother in third trimester    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 female 36w7d. OB History        1    Para        Term                AB        Living           SAB        TAB        Ectopic        Molar        Multiple        Live Births                  Admit for 37wk IOL due to [facility] with IUGR. She is currently being observed with plan for cytotec placement in a few hours at 37wks. GBS was done today and is pending. She has no GBS risk factors. Fetal status overall reassuring. Will watch for s/sx preeclampsia. Plan discussed w/pt including r/b/a. Pt voices understanding and agrees with plan. Kallman's syndrome on synthroid during pregnancy. Might not be able to breastfeed. Pt had neg RPR in early pregnancy, followed by a false pos RPR with neg treponema ab.     Ivonne Loaiza  2020

## 2020-09-02 NOTE — L&D DELIVERY NOTE
Department of Obstetrics and Gynecology   Section Note    Indications: non-reassuring fetal status    Pre-operative Diagnosis: 37 week 0 day pregnancy. Post-operative Diagnosis: Living  infant(s) and Male    Surgeon: Juan Bateman     Assistants: Roya Vaca    Anesthesia: Epidural anesthesia    ASA Class:     Procedure Details   The patient was seen in the Holding Room. The risks, benefits, complications, treatment options, and expected outcomes were discussed with the patient. The patient concurred with the proposed plan, giving informed consent. The site of surgery properly noted/marked. The patient was taken to Operating Room # 2, identified as Priscila Joya and the procedure verified as  Delivery. A Time Out was held and the above information confirmed. After induction of anesthesia, the patient was draped and prepped in the usual sterile manner. A Pfannenstiel incision was made and carried down through the subcutaneous tissue to the fascia. Fascial incision was made and extended transversely. The fascia was  from the underlying rectus tissue superiorly and inferiorly. The peritoneum was identified and entered bluntly. Peritoneal incision was extended longitudinally with traction. The utero-vesical peritoneal reflection was incised transversely and the bladder flap was bluntly freed from the lower uterine segment. A low transverse uterine incision was made. Delivered from [address] [place] [address] presentation was a 2340 gram MALE with Apgar scores of 2 at one minute and 6 at five minutes. After the umbilical cord was clamped and cut cord blood was obtained for evaluation. The placenta was removed intact and appeared normal. The uterine outline, tubes and ovaries appeared normal. The uterine incision was closed with a running suture of 0 Vicryl. A second layer of 0-monocryl was then placed to acquire hemostasis. Lavage was carried out until clear.  The peritoneum was closed with running 3-0 vicryl. The fascia was then reapproximated with running sutures of 0 Vicryl. The sub-Q was closed with 3-0 vicryl suture. The skin was reapproximated with a 4-0 monocryl suture. Instrument, sponge, and needle counts were correct prior the abdominal closure and at the conclusion of the case. Findings:  Normal uterus, tubes and ovaries    Estimated Blood Loss:  700ml           Drains: none           Total IV Fluids: ml           Specimens: cord blood, cord gases, placenta           Implants: none           Complications:  none           Disposition: PACU - hemodynamically stable. Condition: infant stable to general nursery and mother stable    Attending Attestation: I performed the procedure.

## 2020-09-02 NOTE — ANESTHESIA PRE PROCEDURE
Department of Anesthesiology  Preprocedure Note       Name:  Ruby Jacobs   Age:  36 y.o.  :  1985                                          MRN:  [id number]         Date:  2020      Surgeon: * No surgeons listed *    Procedure: * No procedures listed *    Medications prior to admission:   Prior to Admission medications    Medication Sig Start Date End Date Taking?  Authorizing Provider   Probiotic Product (PROBIOTIC-10 PO) Take 1 capsule by mouth daily   Yes Historical Provider, Rashawn Griffin oxycoccus, (CRANBERRY EXTRACT) 200 MG CAPS Take 1 tablet by mouth daily   Yes Historical Provider, MD   EUTHYROX 50 MCG tablet Take 1 tablet by mouth daily 20  Yes Historical Provider, MD   Prenatal Vit-Fe Fumarate-FA (CLASSIC PRENATAL PO) Take 1 tablet by mouth daily   Yes Historical Provider, MD   Misc Natural Products (PROGESTERONE EX) Apply topically    Historical Provider, MD   ondansetron (ZOFRAN) 4 MG tablet Take 1 tablet by mouth 3 times daily as needed for Nausea or Vomiting 20   Donn Poe MD       Current medications:    Current Facility-Administered Medications   Medication Dose Route Frequency Provider Last Rate Last Dose    lactated ringers infusion   Intravenous Continuous Tip Salinas  mL/hr at 20 0912      sodium chloride flush 0.9 % injection 10 mL  10 mL Intravenous 2 times per day Tip Salinas MD        sodium chloride flush 0.9 % injection 10 mL  10 mL Intravenous PRN Tpi Salinas MD        docusate sodium (COLACE) capsule 100 mg  100 mg Oral BID PRN Tip Salinas MD        ondansetron [facility]) injection 4 mg  4 mg Intravenous Q6H PRN Tip Salinas MD        oxytocin (PITOCIN) 30 units in 500 mL infusion  1 andi-units/min Intravenous Continuous PRN Tip Salinas MD        nalbuphine (NUBAIN) injection 10 mg  10 mg Intravenous Q2H PRN Tip Salinas MD        butorphanol (STADOL) injection 1 mg  1 mg Intravenous Q3H PRN Terrial Severance, MD        famotidine (PEPCID) injection 20 mg  20 mg Intravenous BID PRN Terrial Severance, MD        misoprostol (CYTOTEC) pre-split tablet TABS 25 mcg  25 mcg Vaginal Q4H Terrial Severance, MD   25 mcg at 09/02/20 0402     Facility-Administered Medications Ordered in Other Encounters   Medication Dose Route Frequency Provider Last Rate Last Dose    sodium chloride 0.9 % 200 mL with fentaNYL 500 mcg, bupivacaine 0.5% 50 mL (OB) epidural    Continuous PRN Pageland Alken, APRN - CRNA 15 mL/hr at 09/02/20 0858 15 mL/hr at 09/02/20 0858    lidocaine PF 2 % injection    PRN Pageland Alken, APRN - CRNA   2 mL at 09/02/20 0900       Allergies: Allergies   Allergen Reactions    Amoxicillin Rash    Penicillins Rash       Problem List:    Patient Active Problem List   Diagnosis Code    Poor fetal growth affecting management of mother in third trimester O45.26    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 No pertinent surgical history.     Social History:    Social History     Tobacco Use    Smoking status: Never Smoker    Smokeless tobacco: Never Used   Substance Use Topics    Alcohol use: Not Currently                                Counseling given: Not Answered      Vital Signs (Current):   Vitals:    09/02/20 0905 09/02/20 0908 09/02/20 0911 09/02/20 0913   BP: 123/70 130/67 126/62 127/65   Pulse: 85 88 81 85   Resp:       Temp:       TempSrc:       Weight:       Height:                                                  BP Readings from Last 3 Encounters:   09/02/20 127/65   01/20/20 100/81   03/24/19 132/80       NPO Status: Time of last liquid consumption: 1400                        Time of last solid consumption: 1400                        Date of last liquid consumption: 09/01/20                        Date of last solid food consumption: 09/01/20    BMI:   Wt Readings from Last 3 Encounters:   09/01/20 180 lb (81.6 kg)   01/20/20 130 lb (59 kg)   03/24/19 127 lb (57.6 kg)     Body mass index is 31.89 kg/m². CBC:   Lab Results   Component Value Date    WBC 7.4 09/01/2020    RBC 3.98 09/01/2020    HGB 12.4 09/01/2020    HCT 36.5 09/01/2020    MCV 91.7 09/01/2020    RDW 13.7 09/01/2020     09/01/2020       CMP:   Lab Results   Component Value Date     09/01/2020    K 3.8 09/01/2020    K 4.0 03/24/2019     09/01/2020    CO2 19 09/01/2020    BUN 10 09/01/2020    CREATININE <0.5 09/01/2020    GFRAA >60 09/01/2020    AGRATIO 1.2 09/01/2020    LABGLOM >60 09/01/2020    GLUCOSE 106 09/01/2020    PROT 6.0 09/01/2020    CALCIUM 8.9 09/01/2020    BILITOT <0.2 09/01/2020    ALKPHOS 103 09/01/2020    AST 24 09/01/2020    ALT 37 09/01/2020       POC Tests: No results for input(s): POCGLU, POCNA, POCK, POCCL, POCBUN, POCHEMO, POCHCT in the last 72 hours.     Coags: No results found for: PROTIME, INR, APTT    HCG (If Applicable):   Lab Results   Component Value Date    PREGTESTUR Negative 03/24/2019        ABGs: No results found for: PHART, PO2ART, JXQ9FPE, QTK4SMI, BEART, U0LQFRDF     Type & Screen (If Applicable):  No results found for: LABABO, LABRH    Drug/Infectious Status (If Applicable):  No results found for: HIV, HEPCAB    COVID-19 Screening (If Applicable):   Lab Results   Component Value Date    COVID19 Not Detected 09/02/2020         Anesthesia Evaluation  Patient summary reviewed and Nursing notes reviewed no history of anesthetic complications:   Airway: Mallampati: II  TM distance: >3 FB   Neck ROM: full  Mouth opening: > = 3 FB Dental:          Pulmonary:Negative Pulmonary ROS                              Cardiovascular:    (+) hypertension:,                   Neuro/Psych:   (+) psychiatric history:            GI/Hepatic/Renal: Neg GI/Hepatic/Renal ROS            Endo/Other: Negative Endo/Other ROS                    Abdominal:           Vascular: negative vascular ROS. Anesthesia Plan      epidural     ASA 2 - emergent             Anesthetic plan and risks discussed with patient. Plan discussed with attending. Alternatives to, benifits and risks of continuous lumbar epidural for labor (including, but not limited to, hypotension, spinal headache, inadequate sensory blockade) were discussed in detail with the patient. All questions were answered to her satisfaction.   The patient desires and agrees to proceed with continuous epidural.      Junior Nguyen, APRN - CRNA   9/2/2020

## 2020-09-02 NOTE — LACTATION NOTE
This note was copied from a baby's chart. Lactation Progress Note      Data:   RN requesting [facility] assistance with first feed after c/sec. Mob is a 1/0 and baby is currently skin to skin. Action: Assisted with good position skin to skin at breast. Baby rooting but struggling to latch to flat nipple. Baby able to achieve good latch for several good suck bursts with LC assistance. Eventually baby able to sustain the latch. Observed RAFITA with SRS and AS. BF education initiated. [facility] number on board and encouraged to call for f/u prn. Response: Pleased with first feed. Verbalized and demonstrated understanding. Will call for f/u prn.

## 2020-09-02 NOTE — ANESTHESIA PROCEDURE NOTES
Epidural Block    Patient location during procedure: OB  Start time: 9/2/2020 8:50 AM  End time: 9/2/2020 8:56 AM  Reason for block: labor epidural  Staffing  Anesthesiologist: Rosa Bach MD  Resident/CRNA: BRIAN Nava - CRNA  Performed: resident/CRNA   Preanesthetic Checklist  Completed: patient identified, site marked, pre-op evaluation, timeout performed, IV checked, risks and benefits discussed, monitors and equipment checked, anesthesia consent given, oxygen available and patient being monitored  Epidural  Patient position: sitting  Prep: Betadine  Patient monitoring: cardiac monitor, continuous pulse ox and frequent blood pressure checks  Approach: midline  Location: lumbar (1-5)  Injection technique: JULIANA saline  Provider prep: mask and sterile gloves  Needle  Needle type: Tuohy   Needle gauge: 17 G  Needle length: 3.5 in  Catheter type: side hole  Catheter size: 19 G (20 G)  Test dose: negative (3 + 2 cc of 1.5 % xylocaine with epi)  Assessment  Sensory level: T8  Hemodynamics: stable  Attempts: 1  Additional Notes  Pt. prepped and draped in sterile fashion. Skin wheal with 1% lidocaine. 17ga touhy needle to JULIANA. 25 ga. Spinal needle per touhy. CSF visualized in hub. Needle withdrawn and catheter threaded. Negative test dose. Catheter secured with sterile dressing.

## 2020-09-03 LAB
BASOPHILS ABSOLUTE: 0 K/UL (ref 0–0.2)
BASOPHILS RELATIVE PERCENT: 0.2 %
EOSINOPHILS ABSOLUTE: 0 K/UL (ref 0–0.6)
EOSINOPHILS RELATIVE PERCENT: 0.2 %
HCT VFR BLD CALC: 33.7 % (ref 36–48)
HEMOGLOBIN: 11.3 G/DL (ref 12–16)
LYMPHOCYTES ABSOLUTE: 1.8 K/UL (ref 1–5.1)
LYMPHOCYTES RELATIVE PERCENT: 16.8 %
MCH RBC QN AUTO: 31.2 PG (ref 26–34)
MCHC RBC AUTO-ENTMCNC: 33.7 G/DL (ref 31–36)
MCV RBC AUTO: 92.6 FL (ref 80–100)
MONOCYTES ABSOLUTE: 0.8 K/UL (ref 0–1.3)
MONOCYTES RELATIVE PERCENT: 7.7 %
NEUTROPHILS ABSOLUTE: 8 K/UL (ref 1.7–7.7)
NEUTROPHILS RELATIVE PERCENT: 75.1 %
PDW BLD-RTO: 13.5 % (ref 12.4–15.4)
PLATELET # BLD: 171 K/UL (ref 135–450)
PMV BLD AUTO: 7.8 FL (ref 5–10.5)
RBC # BLD: 3.64 M/UL (ref 4–5.2)
WBC # BLD: 10.7 K/UL (ref 4–11)

## 2020-09-03 PROCEDURE — 6360000002 HC RX W HCPCS: Performed by: OBSTETRICS & GYNECOLOGY

## 2020-09-03 PROCEDURE — 86901 BLOOD TYPING SEROLOGIC RH(D): CPT

## 2020-09-03 PROCEDURE — 85025 COMPLETE CBC W/AUTO DIFF WBC: CPT

## 2020-09-03 PROCEDURE — 36415 COLL VENOUS BLD VENIPUNCTURE: CPT

## 2020-09-03 PROCEDURE — 2580000003 HC RX 258: Performed by: OBSTETRICS & GYNECOLOGY

## 2020-09-03 PROCEDURE — 1220000000 HC SEMI PRIVATE OB R&B

## 2020-09-03 PROCEDURE — 86900 BLOOD TYPING SEROLOGIC ABO: CPT

## 2020-09-03 PROCEDURE — 85461 HEMOGLOBIN FETAL: CPT

## 2020-09-03 PROCEDURE — 6370000000 HC RX 637 (ALT 250 FOR IP): Performed by: OBSTETRICS & GYNECOLOGY

## 2020-09-03 RX ADMIN — DOCUSATE SODIUM 100 MG: 100 CAPSULE, LIQUID FILLED ORAL at 22:17

## 2020-09-03 RX ADMIN — ACETAMINOPHEN 1000 MG: 500 TABLET ORAL at 00:39

## 2020-09-03 RX ADMIN — IBUPROFEN 800 MG: 800 TABLET, FILM COATED ORAL at 14:25

## 2020-09-03 RX ADMIN — KETOROLAC TROMETHAMINE 30 MG: 30 INJECTION, SOLUTION INTRAMUSCULAR at 05:42

## 2020-09-03 RX ADMIN — Medication 10 ML: at 05:43

## 2020-09-03 RX ADMIN — ACETAMINOPHEN 1000 MG: 500 TABLET ORAL at 09:46

## 2020-09-03 RX ADMIN — IBUPROFEN 800 MG: 800 TABLET, FILM COATED ORAL at 22:17

## 2020-09-03 RX ADMIN — ACETAMINOPHEN 1000 MG: 500 TABLET ORAL at 17:46

## 2020-09-03 RX ADMIN — DOCUSATE SODIUM 100 MG: 100 CAPSULE, LIQUID FILLED ORAL at 09:46

## 2020-09-03 ASSESSMENT — PAIN SCALES - GENERAL
PAINLEVEL_OUTOF10: 1
PAINLEVEL_OUTOF10: 1
PAINLEVEL_OUTOF10: 3
PAINLEVEL_OUTOF10: 1

## 2020-09-03 NOTE — LACTATION NOTE
This note was copied from a baby's chart. Lactation Progress Note      Data:   F/U on 1/0 breast feeder. Mob states that baby is feeding better but is now using a nipple shield. Currently attempting to put baby to breast bundled and poor position. Action: Assisted with unbundling baby and placing in good position at breast. Few attempts to latch without the nipple shield but not successful. Correct placement of the nipple shield demonstrated. Observed a good latch with SRS and AS. BF education reviewed. Encouraged to call [facility] for f/u prn. Response: Verbalized and demonstrated understanding.

## 2020-09-03 NOTE — ANESTHESIA POSTPROCEDURE EVALUATION
Department of Anesthesiology  Postprocedure Note    Patient: Randell Rowley  MRN: [id number]  YOB: 1985  Date of evaluation: 9/3/2020  Time:  8:45 AM     Procedure Summary     Date:  20 Room / Location:  [facility] L&D OR [address] [place] [address]    Anesthesia Start:  0840 Anesthesia Stop:  8978    Procedures:        SECTION (N/A Abdomen)      Labor Analgesia Diagnosis:  (NRFHT)    Surgeon:  Jacque Delacruz MD Responsible Provider:  Chidi Chavez MD    Anesthesia Type:  epidural ASA Status:  2 - Emergent          Anesthesia Type: No value filed. Adolfo Phase I: Adolfo Score: 9    Adolfo Phase II: Adolfo Score: 9    Last vitals: Reviewed and per EMR flowsheets. Anesthesia Post Evaluation    Level of consciousness: awake and alert  Nausea & Vomiting: no nausea and no vomiting  Complications: no  Cardiovascular status: hemodynamically stable  Respiratory status: acceptable and room air  Hydration status: stable  Comments: POD #1 s/p  section under epidural anesthesia with duramorph. Progress notes, flow sheets, labs, and MARs reviewed. No apparent or reported anesthesia complications thus far.

## 2020-09-04 VITALS
SYSTOLIC BLOOD PRESSURE: 115 MMHG | RESPIRATION RATE: 16 BRPM | HEART RATE: 78 BPM | HEIGHT: 63 IN | OXYGEN SATURATION: 97 % | TEMPERATURE: 98.3 F | DIASTOLIC BLOOD PRESSURE: 70 MMHG | BODY MASS INDEX: 31.89 KG/M2 | WEIGHT: 180 LBS

## 2020-09-04 LAB
ABO/RH: NORMAL
FETAL SCREEN: NORMAL
RHIG LOT NUMBER: NORMAL

## 2020-09-04 PROCEDURE — 96372 THER/PROPH/DIAG INJ SC/IM: CPT

## 2020-09-04 PROCEDURE — 2580000003 HC RX 258: Performed by: OBSTETRICS & GYNECOLOGY

## 2020-09-04 PROCEDURE — 6360000002 HC RX W HCPCS: Performed by: OBSTETRICS & GYNECOLOGY

## 2020-09-04 PROCEDURE — 6370000000 HC RX 637 (ALT 250 FOR IP): Performed by: OBSTETRICS & GYNECOLOGY

## 2020-09-04 RX ORDER — IBUPROFEN 800 MG/1
800 TABLET ORAL EVERY 8 HOURS PRN
Qty: 60 TABLET | Refills: 0 | Status: SHIPPED | OUTPATIENT
Start: 2020-09-04 | End: 2021-12-17 | Stop reason: ALTCHOICE

## 2020-09-04 RX ORDER — OXYCODONE HYDROCHLORIDE 5 MG/1
5 TABLET ORAL EVERY 6 HOURS PRN
Qty: 20 TABLET | Refills: 0 | Status: SHIPPED | OUTPATIENT
Start: 2020-09-04 | End: 2020-09-11

## 2020-09-04 RX ADMIN — Medication 10 ML: at 09:06

## 2020-09-04 RX ADMIN — DOCUSATE SODIUM 100 MG: 100 CAPSULE, LIQUID FILLED ORAL at 09:05

## 2020-09-04 RX ADMIN — HUMAN RHO(D) IMMUNE GLOBULIN 300 MCG: 300 INJECTION, SOLUTION INTRAMUSCULAR at 10:09

## 2020-09-04 RX ADMIN — ACETAMINOPHEN 1000 MG: 500 TABLET ORAL at 10:05

## 2020-09-04 RX ADMIN — IBUPROFEN 800 MG: 800 TABLET, FILM COATED ORAL at 06:18

## 2020-09-04 RX ADMIN — ACETAMINOPHEN 1000 MG: 500 TABLET ORAL at 01:25

## 2020-09-04 RX ADMIN — LEVOTHYROXINE SODIUM 50 MCG: 25 TABLET ORAL at 09:05

## 2020-09-04 ASSESSMENT — PAIN SCALES - GENERAL
PAINLEVEL_OUTOF10: 2
PAINLEVEL_OUTOF10: 2
PAINLEVEL_OUTOF10: 3

## 2020-09-04 NOTE — LACTATION NOTE
This note was copied from a baby's chart. Lactation Progress Note      Data:     F/u during lactation rounds with primip breast feeding mother baby couplet. Pt reports baby is latching and nursing well. Denies any questions or concerns at this time. Started using a shield overnight. Action: Reviewed tips for RAFITA, and weaning from shield. Breast feeding education reviewed including breast care, expected  feeding behaviors, and how to know baby is getting enough at the breast including appropriate output and weight trends. Encouraged much STS contact, and offering the breast often when infant is first beginning to show hunger cues, and every 2-3 hours if baby is sleepy and without feeding cues. Instructed that baby should have a minimum of 8-12 good feedings in a 24 hour period after the first DOL. Name and number on whiteboard. Encouraged to call for f/u support prn. Response: Verbalized understanding of teaching provided and comfortable with breast feeding at this time. Will call for f/u support prn.

## 2020-09-04 NOTE — LACTATION NOTE
This note was copied from a baby's chart. Lactation Progress Note      Data:   F/U with primip 2PO with breast feeding and lactation rounds. MOB states that infant has been breast feeding well. States that he just finished feeding prior to consult. Denies questions or concerns at this time. Infant output established 10 urine/ 5 stool, weight loss @ 7%. Action: Introduced self to patient as [facility] for the day. Name and number placed on whiteboard. BF education reviewed with patient and what to expect over then next 1-2 weeks with mature milk production, infant feedings, infant output, breast care, engorgement prevention, pacifier, bottle use, and pumping information. Discharge binder also reviewed with patient with f/u care and resources provided. All questions answered at this time. RN updated with education that was provided to patient. Patient instructed to call for f/u care as needed. Response: Comfortable with breast feeding at this time. Verbalizes understanding of breast feeding education that was provided. Will call for f/u care as needed during her stay, and with outpatient services.

## 2020-09-04 NOTE — PROGRESS NOTES
Cytotec #1 placed at this time. Patient tolerated well.
Discussed current clinical situation with the patient. Cervix is unchanged from prior exam, contractions on IUPC have been adequate. Fetal heart tracing will not allow for any augmentation and montevideo units are adequate anyway. In the setting of severe growth restriction, frequent painful contractions and and recurrent decels following contractions there is a concern for placental abruption. The amniotic fluid has been blood stained since rupture of membranes. Answered all questions and recommended  delivery at this time. Informed consent obtained, will plan to proceed to the OR.
Dr. Felipe Mccullough at bedside talking with patient. The plan is to place cytotec at 0000 due to [facility].
First time get up to chair x2 RN. Complete linen change, Comfort pad placed on bed. Gown changed. Pérez care done with assistance. Patient back to bed with assistance.  Patient tolerated well
ID bands checked. Infant's ID band and Mother's matching ID bands removed and taped to discharge instruction sheet, the mother verified as correct and witnessed by RN. Umbilical clamp and HUGS tag removed. Mom and  Infant discharged via wheelchair to private car. Infant placed in car seat per parents. Mom and baby accompanied by family and in stable condition.
OBGYN Attending Progress Note  POD#2 s/p PLTCD 2/2 NRFHT pregnancy c/b IUGR & in setting of GHTN  EBL 700mL    S: No complaints, katie po, pain controlled by meds, + ambulation, +flatus, voiding without difficulty, lochia less than menses. No reported s/sx of PEC. O: BP (!) 147/77   Pulse 68   Temp 97.8 °F (36.6 °C) (Axillary)   Resp 16   Ht 5' 3\" (1.6 m)   Wt 180 lb (81.6 kg)   SpO2 97%   Breastfeeding Unknown   BMI 31.89 kg/m²   Abd - soft, fundus firm below umbilicus, incision c/d/i w steri strips, healing well  Ext warm well perfused    WBC/Hgb/Hct/Plts:  10.7/11.3/33.7/171 (09/03 0701)    A/P: POD #2   1. Recovering well, bp have been well controlled. One isolated bp 147/77 otherwise wnl (/51-82), cont to monitor. 2. Meeting post op milestones. 3. Cont routine pp care  4. Pt desires early discharge, if still progressing well and bp well controlled consider early discharge home later today.
Postop C/S     Pt is a 36 y.o.  POD1 s/p C/S recovering well. Pain well controlled. Lochia appropriate. Tolerating diet. Ambulating. Pérez in place.      Vitals:    20 1805 20 2107 20 0114 20 0521   BP:  115/83 127/82 127/85   Pulse:  93 86 83   Resp:  16 16 16   Temp:  98.3 °F (36.8 °C) 98.7 °F (37.1 °C) 98.5 °F (36.9 °C)   TempSrc:  Axillary Oral Oral   SpO2: 97% 99% 95%    Weight:       Height:        overnight  Abdomen soft, incision well healing  Ext: non tender    Hgb pending     A/P: POD1  Vitals wnl  Continue routine postop care  Pelvic and lifting restrictions reviewed  Male infant declines circumcision     Radhames Morales MD
Report received from MADISYN Hernandez RN. Bedside report was given. Introduced myself to patient as her RN for the shift. Wrote name and number on white board and patient was given plan for the night. Patient verbalized understanding and denies any further needs at this time.
S: patient comfortable with an epidural    O: /67   Pulse 75   Temp 98.7 °F (37.1 °C) (Oral)   Resp 16   Ht 5' 3\" (1.6 m)   Wt 180 lb (81.6 kg)   BMI 31.89 kg/m²   Cvx - 1/80/-2  FHT - 125, + accel, mod variability  [place] - q 2-3 minutes    A/P: 35 yo G1 @ 37w0d  1. IOL for IUGR and gestational hypertension - AROM performed and blood stained  2. GBS unknown, no risk factors  3.  FHT reassuring
S: patient painful with contractions    O: /87   Pulse 70   Temp 98.9 °F (37.2 °C) (Oral)   Resp 16   Ht 5' 3\" (1.6 m)   Wt 180 lb (81.6 kg)   BMI 31.89 kg/m²   Cvx - FT/70/-2  FHT - 125, + accel, mod variability  [place] - cannot determine    WBC/Hgb/Hct/Plts:  7.4/12.4/36.5/217 (09/01 1638)    A/P: 33yo G1 @ 37w0d  1. IUGR - EFW 6% last week, elevated blood pressures yesterday, s/p cervical ripening  2. Epidural now and then may attempt AROM  3. FHT reassuring  4.  GBS unknown
This RN and Dr. Antonio Lockwood at bedside at this time. Dr. Antonio Lockwood reviewed tracing and performed a cervical exam. Dr. Antonio Pabloea aware of repetitive late decelerations and coupling contractions. Dr. Antonio Lockwood gave no new orders.
This RN contacted Dr. Harish Partida to provide an update that pt continues to have repetitive late decelerations. Dr. Harish Partida stated that he will be in to assess pt in 10 minutes.
__________________________  13. Do you have any other questions or concerns I can address today?  Y/N  __________________________________________________      Teaching During interview :_____________________________________________  ___________________________RN       Date:______________Time:________________

## 2020-09-04 NOTE — PLAN OF CARE
Problem: Discharge Planning:  Goal: Discharged to appropriate level of care  9/4/2020 0643 by Kelly Marrufo RN  Outcome: Ongoing  9/3/2020 2339 by Richard Persaud RN  Outcome: Ongoing     Problem: Fluid Volume - Imbalance:  Goal: Absence of postpartum hemorrhage signs and symptoms  9/4/2020 0643 by Kelly Marrufo RN  Outcome: Ongoing  9/3/2020 2339 by Richard Persaud RN  Outcome: Ongoing  Goal: Absence of imbalanced fluid volume signs and symptoms  9/4/2020 0643 by Kelly Marrufo RN  Outcome: Ongoing  9/3/2020 2339 by Richard Persaud RN  Outcome: Ongoing     Problem: Infection - Surgical Site:  Goal: Will show no infection signs and symptoms  9/4/2020 0643 by Kelly Marrufo RN  Outcome: Ongoing  9/3/2020 2339 by Richard Persaud RN  Outcome: Ongoing     Problem: Mood - Altered:  Goal: Mood stable  Outcome: Ongoing     Problem: Nausea/Vomiting:  Goal: Absence of nausea/vomiting  Outcome: Ongoing     Problem: Pain - Acute:  Goal: Pain level will decrease  9/4/2020 0643 by Kelly Marrufo RN  Outcome: Ongoing  9/3/2020 2339 by Richard Persaud RN  Outcome: Ongoing     Problem: Urinary Retention:  Goal: Urinary elimination within specified parameters  Outcome: Ongoing     Problem: Venous Thromboembolism:  Goal: Will show no signs or symptoms of venous thromboembolism  Outcome: Ongoing  Goal: Absence of signs or symptoms of impaired coagulation  Outcome: Ongoing

## 2021-04-14 ENCOUNTER — TELEMEDICINE (OUTPATIENT)
Dept: INTERNAL MEDICINE CLINIC | Age: 36
End: 2021-04-14
Payer: MEDICAID

## 2021-04-14 DIAGNOSIS — M54.2 NECK PAIN, ACUTE: ICD-10-CM

## 2021-04-14 DIAGNOSIS — Z30.09 ENCOUNTER FOR COUNSELING REGARDING INITIATION OF OTHER CONTRACEPTIVE MEASURE: ICD-10-CM

## 2021-04-14 DIAGNOSIS — M54.6 ACUTE BILATERAL THORACIC BACK PAIN: Primary | ICD-10-CM

## 2021-04-14 PROBLEM — M54.9 BILATERAL BACK PAIN: Status: ACTIVE | Noted: 2021-04-14

## 2021-04-14 PROCEDURE — 99204 OFFICE O/P NEW MOD 45 MIN: CPT | Performed by: NURSE PRACTITIONER

## 2021-04-14 PROCEDURE — G8427 DOCREV CUR MEDS BY ELIG CLIN: HCPCS | Performed by: NURSE PRACTITIONER

## 2021-04-14 ASSESSMENT — PATIENT HEALTH QUESTIONNAIRE - PHQ9
SUM OF ALL RESPONSES TO PHQ9 QUESTIONS 1 & 2: 0
2. FEELING DOWN, DEPRESSED OR HOPELESS: 0
1. LITTLE INTEREST OR PLEASURE IN DOING THINGS: 0
SUM OF ALL RESPONSES TO PHQ QUESTIONS 1-9: 0
SUM OF ALL RESPONSES TO PHQ QUESTIONS 1-9: 0

## 2021-04-14 ASSESSMENT — ENCOUNTER SYMPTOMS
RESPIRATORY NEGATIVE: 1
GASTROINTESTINAL NEGATIVE: 1
ALLERGIC/IMMUNOLOGIC NEGATIVE: 1
BACK PAIN: 1

## 2021-04-14 NOTE — PROGRESS NOTES
Radha Bernal (:  1985) is a 30 y.o. female,Established patient, here for evaluation of the following chief complaint(s): No chief complaint on file. ASSESSMENT/PLAN:  1. Acute bilateral thoracic back pain  Assessment & Plan:    Continue NSAIDS and hot/cold therapy. Follow up if symptoms worsen. 2. Neck pain, acute  Assessment & Plan:   Continue NSAIDs and hot/cold therapy. Call if symptoms worsen. 3. Encounter for counseling regarding initiation of other contraceptive measure  Assessment & Plan:   Started on Ortho-Try-Cyclen      No follow-ups on file. SUBJECTIVE/OBJECTIVE:  HPIPatient on virtual visit for back and neck pain. States she has had pain in middle pack and in neck for about 1 week. Pain is a 3/10. She does have some relief with NSAIDS and hot/cold packs. She works at [employer] and lifts heavy boxes. She also would like to restart birth control now that she has stopped breastfeeding. Review of Systems   Constitutional: Negative. HENT: Negative. Respiratory: Negative. Cardiovascular: Negative. Gastrointestinal: Negative. Endocrine: Negative. Genitourinary: Negative. Musculoskeletal: Positive for back pain and neck pain. Skin: Negative. Allergic/Immunologic: Negative. Hematological: Negative. Psychiatric/Behavioral: Negative. No flowsheet data found. Physical Exam  Constitutional:       Appearance: Normal appearance. HENT:      Head: Normocephalic and atraumatic. Nose: Nose normal.   Eyes:      Extraocular Movements: Extraocular movements intact. Conjunctiva/sclera: Conjunctivae normal.      Pupils: Pupils are equal, round, and reactive to light. Neck:      Musculoskeletal: Normal range of motion. Pulmonary:      Effort: Pulmonary effort is normal.   Musculoskeletal: Normal range of motion. Skin:     Coloration: Skin is not jaundiced or pale. Findings: No bruising, erythema, lesion or rash.    Neurological: Mental Status: She is alert and oriented to person, place, and time. Mental status is at baseline. Psychiatric:         Mood and Affect: Mood normal.         Behavior: Behavior normal.         Thought Content: Thought content normal.         Judgment: Judgment normal.         [INSTRUCTIONS:  \"[x]\" Indicates a positive item  \"[]\" Indicates a negative item  -- DELETE ALL ITEMS NOT EXAMINED]    Constitutional: [x] Appears well-developed and well-nourished [x] No apparent distress      [] Abnormal -     Mental status: [x] Alert and awake  [x] Oriented to person/place/time [x] Able to follow commands    [] Abnormal -     Eyes:   EOM    [x]  Normal    [] Abnormal -   Sclera  [x]  Normal    [] Abnormal -          Discharge [x]  None visible   [] Abnormal -     HENT: [x] Normocephalic, atraumatic  [] Abnormal -   [x] Mouth/Throat: Mucous membranes are moist    External Ears [x] Normal  [] Abnormal -    Neck: [x] No visualized mass [] Abnormal -     Pulmonary/Chest: [x] Respiratory effort normal   [x] No visualized signs of difficulty breathing or respiratory distress        [] Abnormal -      Musculoskeletal:   [x] Normal gait with no signs of ataxia         [x] Normal range of motion of neck        [] Abnormal -     Neurological:        [x] No Facial Asymmetry (Cranial nerve 7 motor function) (limited exam due to video visit)          [x] No gaze palsy        [] Abnormal -          Skin:        [x] No significant exanthematous lesions or discoloration noted on facial skin         [] Abnormal -            Psychiatric:       [x] Normal Affect [] Abnormal -        [x] No Hallucinations    Other pertinent observable physical exam findings:-          On this date 4/14/2021 I have spent 30 minutes reviewing previous notes, test results and face to face (virtual) with the patient discussing the diagnosis and importance of compliance with the treatment plan as well as documenting on the day of the visit.       Josef Nguyen

## 2021-09-07 PROBLEM — Z11.3 SCREENING FOR STD (SEXUALLY TRANSMITTED DISEASE): Status: ACTIVE | Noted: 2020-09-16

## 2021-09-07 PROBLEM — N91.2 AMENORRHEA: Status: RESOLVED | Noted: 2017-03-27 | Resolved: 2021-09-07

## 2021-09-07 PROBLEM — O09.899 SUPERVISION OF OTHER HIGH RISK PREGNANCIES, UNSPECIFIED TRIMESTER: Status: ACTIVE | Noted: 2021-09-07

## 2021-09-07 PROBLEM — L76.82 PAIN AT SURGICAL INCISION: Status: RESOLVED | Noted: 2021-04-14 | Resolved: 2021-09-07

## 2021-09-07 PROBLEM — Z98.891 S/P CESAREAN SECTION: Status: RESOLVED | Noted: 2020-09-02 | Resolved: 2021-09-07

## 2021-09-07 PROBLEM — M54.9 BILATERAL BACK PAIN: Status: RESOLVED | Noted: 2021-04-14 | Resolved: 2021-09-07

## 2021-09-07 PROBLEM — E23.0: Status: ACTIVE | Noted: 2017-04-19

## 2021-09-07 PROBLEM — Z11.3 SCREENING FOR STD (SEXUALLY TRANSMITTED DISEASE): Status: RESOLVED | Noted: 2020-09-16 | Resolved: 2021-09-07

## 2021-09-07 PROBLEM — O99.213 OBESITY COMPLICATING PREGNANCY IN THIRD TRIMESTER: Status: ACTIVE | Noted: 2021-09-07

## 2021-09-07 PROBLEM — R76.8 BIOLOGICAL FALSE POSITIVE RPR TEST: Status: RESOLVED | Noted: 2020-09-01 | Resolved: 2021-09-07

## 2021-09-07 PROBLEM — N97.9 FEMALE FERTILITY PROBLEM: Status: RESOLVED | Noted: 2020-09-01 | Resolved: 2021-09-07

## 2021-09-07 PROBLEM — L76.82 PAIN AT SURGICAL INCISION: Status: ACTIVE | Noted: 2021-04-14

## 2021-09-07 PROBLEM — O26.892 RH NEGATIVE STATUS DURING PREGNANCY IN SECOND TRIMESTER: Status: ACTIVE | Noted: 2020-09-01

## 2021-09-07 PROBLEM — O09.899 SUPERVISION OF OTHER HIGH RISK PREGNANCIES, UNSPECIFIED TRIMESTER: Status: RESOLVED | Noted: 2021-09-07 | Resolved: 2021-09-07

## 2021-09-07 PROBLEM — O99.213 OBESITY COMPLICATING PREGNANCY IN THIRD TRIMESTER: Status: RESOLVED | Noted: 2021-09-07 | Resolved: 2021-09-07

## 2021-09-07 PROBLEM — O26.892 RH NEGATIVE STATUS DURING PREGNANCY IN SECOND TRIMESTER: Status: RESOLVED | Noted: 2020-09-01 | Resolved: 2021-09-07

## 2021-09-07 PROBLEM — N91.2 AMENORRHEA: Status: ACTIVE | Noted: 2017-03-27

## 2021-09-07 PROBLEM — Z67.91 RH NEGATIVE STATUS DURING PREGNANCY IN SECOND TRIMESTER: Status: RESOLVED | Noted: 2020-09-01 | Resolved: 2021-09-07

## 2021-09-07 PROBLEM — O13.3 GESTATIONAL HYPERTENSION, THIRD TRIMESTER: Status: RESOLVED | Noted: 2020-09-01 | Resolved: 2021-09-07

## 2021-09-07 PROBLEM — O36.5930 POOR FETAL GROWTH AFFECTING MANAGEMENT OF MOTHER IN THIRD TRIMESTER: Status: RESOLVED | Noted: 2020-09-01 | Resolved: 2021-09-07

## 2021-12-17 ENCOUNTER — OFFICE VISIT (OUTPATIENT)
Dept: PRIMARY CARE CLINIC | Age: 36
End: 2021-12-17
Payer: COMMERCIAL

## 2021-12-17 VITALS
HEIGHT: 63 IN | WEIGHT: 150.8 LBS | SYSTOLIC BLOOD PRESSURE: 138 MMHG | BODY MASS INDEX: 26.72 KG/M2 | TEMPERATURE: 97.5 F | HEART RATE: 84 BPM | DIASTOLIC BLOOD PRESSURE: 80 MMHG

## 2021-12-17 DIAGNOSIS — M94.0 COSTOCHONDRITIS: Primary | ICD-10-CM

## 2021-12-17 DIAGNOSIS — Z23 NEED FOR INFLUENZA VACCINATION: ICD-10-CM

## 2021-12-17 PROCEDURE — 90471 IMMUNIZATION ADMIN: CPT | Performed by: STUDENT IN AN ORGANIZED HEALTH CARE EDUCATION/TRAINING PROGRAM

## 2021-12-17 PROCEDURE — 90686 IIV4 VACC NO PRSV 0.5 ML IM: CPT | Performed by: STUDENT IN AN ORGANIZED HEALTH CARE EDUCATION/TRAINING PROGRAM

## 2021-12-17 PROCEDURE — 99203 OFFICE O/P NEW LOW 30 MIN: CPT | Performed by: STUDENT IN AN ORGANIZED HEALTH CARE EDUCATION/TRAINING PROGRAM

## 2021-12-17 RX ORDER — DESOG-E.ESTRADIOL/E.ESTRADIOL 21-5 (28)
TABLET ORAL
COMMUNITY
Start: 2021-12-05

## 2021-12-17 RX ORDER — NAPROXEN 500 MG/1
500 TABLET ORAL 2 TIMES DAILY WITH MEALS
Qty: 28 TABLET | Refills: 1 | Status: SHIPPED | OUTPATIENT
Start: 2021-12-17 | End: 2021-12-31

## 2021-12-17 RX ORDER — NAPROXEN 500 MG/1
500 TABLET ORAL 2 TIMES DAILY WITH MEALS
Qty: 28 TABLET | Refills: 1 | Status: SHIPPED | OUTPATIENT
Start: 2021-12-17 | End: 2021-12-17 | Stop reason: SDUPTHER

## 2021-12-17 SDOH — ECONOMIC STABILITY: FOOD INSECURITY: WITHIN THE PAST 12 MONTHS, YOU WORRIED THAT YOUR FOOD WOULD RUN OUT BEFORE YOU GOT MONEY TO BUY MORE.: NEVER TRUE

## 2021-12-17 SDOH — ECONOMIC STABILITY: FOOD INSECURITY: WITHIN THE PAST 12 MONTHS, THE FOOD YOU BOUGHT JUST DIDN'T LAST AND YOU DIDN'T HAVE MONEY TO GET MORE.: NEVER TRUE

## 2021-12-17 ASSESSMENT — ENCOUNTER SYMPTOMS
WHEEZING: 0
CONSTIPATION: 0
COUGH: 0
CHOKING: 0
BACK PAIN: 0
DIARRHEA: 0
SHORTNESS OF BREATH: 0

## 2021-12-17 ASSESSMENT — SOCIAL DETERMINANTS OF HEALTH (SDOH): HOW HARD IS IT FOR YOU TO PAY FOR THE VERY BASICS LIKE FOOD, HOUSING, MEDICAL CARE, AND HEATING?: NOT HARD AT ALL

## 2021-12-17 NOTE — PROGRESS NOTES
[facility]  Establish care visit   2021    Mili Saavedra (:  1985) is a 29 y.o. female, here to establish care. Chief Complaint   Patient presents with    Chest Pain    Established New Doctor        ASSESSMENT/ PLAN  1. Costochondritis  Uncontrolled, this is a new problem. Start naproxen, physical therapy. Also recommend lidocaine patches and Voltaren gel in affected area. - Ambulatory referral to Physical Therapy  - naproxen (NAPROSYN) 500 MG tablet; Take 1 tablet by mouth 2 times daily (with meals) for 14 days  Dispense: 28 tablet; Refill: 1    2. Need for influenza vaccination  - INFLUENZA, QUADV, 3 YRS AND OLDER, IM PF, PREFILL SYR OR SDV, 0.5ML (AFLURIA QUADV, PF)       Return in about 4 weeks (around 2022) for annual physical.    HPI  Patient presents today to establish care with concerns of midsternal, reproducible chest pain. She is currently breast-feeding, and states that for the past month or so she is experienced sharp and dull achy pain in her midsternal region. The pain does not radiate. She has taken over-the-counter ibuprofen with minimal improvement in the pain. She has tried home exercises without improvement in the pain. Not associated with eating. No shortness of breath, nausea. She is 9 months postpartum. 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 Patient lives at home with her , son and stepdaughter. She works from home as a . ROS  Review of Systems   Constitutional: Negative for activity change, fever and unexpected weight change. Eyes: Negative for visual disturbance. Respiratory: Negative for cough, choking, shortness of breath and wheezing. Cardiovascular: Negative for leg swelling. Gastrointestinal: Negative for constipation and diarrhea. Genitourinary: Negative for difficulty urinating and dysuria. Musculoskeletal: Negative for back pain, gait problem, neck pain and neck stiffness. Neurological: Negative for weakness, numbness and headaches. HISTORIES  Current Outpatient Medications on File Prior to Visit   Medication Sig Dispense Refill    Cranberry, Vacc oxycoccus, (CRANBERRY EXTRACT) 200 MG CAPS Take 1 tablet by mouth daily      KARIVA 0.15-0.02/0.01 MG (/) per tablet        No current facility-administered medications on file prior to visit.         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[facility] [address]     Obesity complicating pregnancy in third trimester 2021    Poor fetal growth affecting management of mother in third trimester 2020    Rh negative status during pregnancy in second trimester 2020    Supervision of other high risk pregnancies, unspecified trimester 2021       Patient Active Problem List   Diagnosis    Hypogonadism, hypogonadotropic, with anosmia ([address]    Hypopituitarism ([address]       Past Surgical History:   Procedure Laterality Date     SECTION N/A 2020     SECTION performed by Venkat Villareal MD at [facility] [place] [facility]       Social History     Socioeconomic History    Marital status:      Spouse name: Not on file    Number of children: 1    Years of education: Not on file    Highest education level: Not on file   Occupational History    Occupation: Customer service call center   Tobacco Use    Smoking status: Never Smoker    Smokeless tobacco: Never Used   Vaping Use    Vaping Use: Never used   Substance and Sexual Activity    Alcohol use: Not Currently    Drug use: Yes     Types: Marijuana Valdene Love)    Sexual activity: Yes     Partners: Male     Birth control/protection: Pill   Other Topics Concern    Not on file   Social History Narrative    Lives at home with  and 2 kids      Social Determinants of Health     Financial Resource Strain: Low Risk     Difficulty of Paying Living Expenses: Not hard at all   Food Insecurity: No Food Insecurity    Worried About Running Out of Food in the Last Year: Never true    [address] [facility] [address] in the Last Year: Never true   Transportation Needs:     Lack of Transportation (Medical): Not on file    Lack of Transportation (Non-Medical): Not on file   Physical Activity:     Days of Exercise per Week: Not on file    Minutes of Exercise per Session: Not on file   Stress:     Feeling of Stress : Not on file   Social Connections:     Frequency of Communication with Friends and Family: Not on file    Frequency of Social Gatherings with Friends and Family: Not on file    Attends Anabaptist Services: Not on file    Active Member of [address] [place] [address] or Organizations: Not on file    Attends Club or Organization Meetings: Not on file    Marital Status: Not on file   Intimate Partner Violence:     Fear of Current or Ex-Partner: Not on file    Emotionally Abused: Not on file    Physically Abused: Not on file    Sexually Abused: Not on file   Housing Stability:     Unable to Pay for Housing in the Last Year: Not on file    Number of [place] in the Last Year: Not on file    Unstable Housing in the Last Year: Not on file        No family history on file.     PE  Vitals:    12/17/21 1101   BP: 138/80   Pulse: 84   Temp: 97.5 °F (36.4 °C)   Weight: 150 lb 12.8 oz (68.4 kg)   Height: 5' 3\" (1.6 m)     Estimated body mass index is 26.71 kg/m² as calculated from the following:    Height as of this encounter: 5' 3\" (1.6 m). Weight as of this encounter: 150 lb 12.8 oz (68.4 kg). Physical Exam  Vitals reviewed. Constitutional:       General: She is not in acute distress. Appearance: Normal appearance. HENT:      Head: Normocephalic and atraumatic. Cardiovascular:      Rate and Rhythm: Normal rate and regular rhythm. Pulses: Normal pulses. Heart sounds: Normal heart sounds. Pulmonary:      Effort: Pulmonary effort is normal.      Breath sounds: Normal breath sounds. Musculoskeletal:         General: Tenderness present. No deformity or signs of injury. Normal range of motion. Cervical back: Normal range of motion. Right lower leg: No edema. Left lower leg: No edema. Comments: Reproducible, midsternal chest pain. Skin:     General: Skin is warm and dry. Capillary Refill: Capillary refill takes less than 2 seconds. Neurological:      General: No focal deficit present. Mental Status: She is alert. Sensory: No sensory deficit. Motor: No weakness.       Gait: Gait normal.   Psychiatric:         Mood and Affect: Mood normal.         Behavior: Behavior normal.         Immunization History   Administered Date(s) Administered    COVID-19, Moderna, Primary or Immunocompromised, PF, 100mcg/0.5mL 04/25/2021, 05/23/2021    Hepatitis A/Hepatitis B (Twinrix) 09/29/2015, 09/29/2015    Influenza Virus Vaccine 09/29/2015    Influenza, MDCK Quadv, with preserv IM (Flucelvax 2 yrs and older) 09/29/2015    Influenza, Jamas Jointer, IM, PF (6 mo and older Fluzone, Flulaval, Fluarix, and 3 yrs and older Afluria) 10/04/2019, 10/04/2019, 11/18/2020, 11/18/2020, 12/17/2021    MMR 10/04/2019, 10/04/2019    Tdap (Boostrix, Adacel) 05/28/2020, 05/28/2020    Typhoid Vi capsular polysaccharide (Typhim VI) 09/29/2015, 09/29/2015       Health Maintenance   Topic Date Due    Cervical cancer screen  Never done    Diabetes screen  Never done    COVID-19 Vaccine (3 -

## 2021-12-17 NOTE — PATIENT INSTRUCTIONS
voltaren gel or lidocaine patches over the counter and apply to affected area       Patient Education        Costochondritis: Care Instructions  Your Care Instructions  You have chest pain because the cartilage of your rib cage is inflamed. This problem is called costochondritis. This type of chest wall pain may last from days to weeks. It is not a heart problem. Sometimes costochondritis occurs with a cold or the flu, and other times the exact cause is not known. Follow-up care is a key part of your treatment and safety. Be sure to make and go to all appointments, and call your doctor if you are having problems. It's also a good idea to know your test results and keep a list of the medicines you take. How can you care for yourself at home? · Take medicines for pain and inflammation exactly as directed. ? If the doctor gave you a prescription medicine, take it as prescribed. ? If you are not taking a prescription pain medicine, ask your doctor if you can take an over-the-counter medicine. ? Do not take two or more pain medicines at the same time unless the doctor told you to. Many pain medicines have acetaminophen, which is Tylenol. Too much acetaminophen (Tylenol) can be harmful. · It may help to use a warm compress or heating pad (set on low) on your chest. You can also try alternating heat and ice. Put ice or a cold pack on the area for 10 to 20 minutes at a time. Put a thin cloth between the ice and your skin. · Avoid any activity that strains the chest area. As your pain gets better, you can slowly return to your normal activities. · Do not use tape, an elastic bandage, a \"rib belt,\" or anything else that restricts your chest wall motion. When should you call for help? Call 911 anytime you think you may need emergency care. For example, call if:    · You have new or different chest pain or pressure. This may occur with:  ? Sweating. ? Shortness of breath. ? Nausea or vomiting.   ? Pain that spreads from the chest to the neck, jaw, or one or both shoulders or arms. ? Dizziness or lightheadedness. ? A fast or uneven pulse. After calling 911, chew 1 adult-strength aspirin. Wait for an ambulance. Do not try to drive yourself.     · You have severe trouble breathing. Call your doctor now or seek immediate medical care if:    · You have a fever or cough.     · You have any trouble breathing.     · Your chest pain gets worse. Watch closely for changes in your health, and be sure to contact your doctor if:    · Your chest pain continues even though you are taking anti-inflammatory medicine.     · Your chest wall pain has not improved after 5 to 7 days. Where can you learn more? Go to https://zumatek.[employer]. org and sign in to your [employer] account. Enter S381 in the [employer] box to learn more about \"Costochondritis: Care Instructions. \"     If you do not have an account, please click on the \"Sign Up Now\" link. Current as of: July 1, 2021               Content Version: 13.0  © 8175-1031 Healthwise, Inc[employer]. Care instructions adapted under license by [facility] ([facility]). If you have questions about a medical condition or this instruction, always ask your healthcare professional. [address]  any warranty or liability for your use of this information.

## 2022-02-21 ENCOUNTER — TELEPHONE (OUTPATIENT)
Dept: PRIMARY CARE CLINIC | Age: 37
End: 2022-02-21

## 2022-02-21 NOTE — TELEPHONE ENCOUNTER
----- Message from Derrell Suazo sent at 2/21/2022  1:45 PM EST -----  Subject: Message to Provider    QUESTIONS  Information for Provider? pt had VV [id number] for possible UTI was given a   med and told if it did not get any better would need to schd appt to have   a urinalysis done --[employer] works till 530 was going to go to urgent care but   prefers to see pcp  ---------------------------------------------------------------------------  --------------  [address]  What is the best way for the office to contact you? OK to leave message on   voicemail  Preferred Call Back Phone Number? [phone]  ---------------------------------------------------------------------------  --------------  SCRIPT ANSWERS  Relationship to Patient? Self  Have you recently (14 days) seen a provider for this problem?  Yes

## 2022-03-03 ENCOUNTER — TELEMEDICINE (OUTPATIENT)
Dept: PRIMARY CARE CLINIC | Age: 37
End: 2022-03-03
Payer: COMMERCIAL

## 2022-03-03 DIAGNOSIS — R10.9 FLANK PAIN: Primary | ICD-10-CM

## 2022-03-03 DIAGNOSIS — N12 PYELONEPHRITIS: ICD-10-CM

## 2022-03-03 DIAGNOSIS — R35.0 URINARY FREQUENCY: ICD-10-CM

## 2022-03-03 PROCEDURE — 99214 OFFICE O/P EST MOD 30 MIN: CPT | Performed by: STUDENT IN AN ORGANIZED HEALTH CARE EDUCATION/TRAINING PROGRAM

## 2022-03-03 RX ORDER — CIPROFLOXACIN 500 MG/1
500 TABLET, FILM COATED ORAL 2 TIMES DAILY
Qty: 10 TABLET | Refills: 0 | Status: SHIPPED | OUTPATIENT
Start: 2022-03-03 | End: 2022-03-08

## 2022-03-03 ASSESSMENT — ENCOUNTER SYMPTOMS
ABDOMINAL PAIN: 0
BLOOD IN STOOL: 0
NAUSEA: 0
ABDOMINAL DISTENTION: 0
DIARRHEA: 0
CONSTIPATION: 0
VOMITING: 0

## 2022-03-03 NOTE — PROGRESS NOTES
[facility]  Virtual visit   3/3/2022    Barby Flynn (:  1985) is a 29 y.o. female, here for evaluation of the following medical concerns:    Chief Complaint   Patient presents with    Urinary Frequency        ASSESSMENT/ PLAN:  1. Flank pain  2. Urinary frequency  Uncontrolled, persistent despite course of Bactrim. Could be related to resistant bacterial UTI, pyelonephritis, nephrolithiasis. Check KUB for radiopaque stone. Check white count, kidney function, urinalysis with culture. Will of pregnancy. We will empirically treat for pyelonephritis today. - XR ABDOMEN (KUB) (SINGLE AP VIEW); Future  - CBC with Auto Differential; Future  - Comprehensive Metabolic Panel; Future  - Culture, Urine; Future  - Urinalysis with Microscopic; Future  - Pregnancy, Urine; Future    3. Pyelonephritis  Per above, consider CT abdomen pelvis or renal ultrasound if persistent or worsening  - ciprofloxacin (CIPRO) 500 MG tablet; Take 1 tablet by mouth 2 times daily for 5 days  Dispense: 10 tablet; Refill: 0       Return in about 1 week (around 3/10/2022), or if symptoms worsen or fail to improve. HPI  Patient notes symptoms of hematuria, urinary frequency for the past week. [facility] of Bactrim, which she completed. She states that the hematuria has resolved, however persistent urinary frequency. She also notes flank pain. No fever, chills, nausea, vomiting. No personal history of nephrolithiasis. She has been taking Azo urinary tract discomfort supplement with improvement in her symptoms. ROS  Review of Systems   Constitutional: Negative for activity change, appetite change, fatigue, fever and unexpected weight change. Cardiovascular: Negative for leg swelling. Gastrointestinal: Negative for abdominal distention, abdominal pain, blood in stool, constipation, diarrhea, nausea and vomiting.    Genitourinary: Positive for flank pain, frequency, pelvic pain and urgency. Negative for dysuria, vaginal bleeding, vaginal discharge and vaginal pain. Musculoskeletal: Negative for myalgias. Allergic/Immunologic: Negative for food allergies. Psychiatric/Behavioral: The patient is not nervous/anxious. HISTORIES  Current Outpatient Medications on File Prior to Visit   Medication Sig Dispense Refill    KARIVA 0.15-0.02/0.01 MG (21/5) per tablet       naproxen (NAPROSYN) 500 MG tablet Take 1 tablet by mouth 2 times daily (with meals) for 14 days 28 tablet 1    Cranberry, Vacc oxycoccus, (CRANBERRY EXTRACT) 200 MG CAPS Take 1 tablet by mouth daily       No current facility-administered medications on file prior to visit. 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 Estimated body mass index is 26.71 kg/m² as calculated from the following:    Height as of 12/17/21: 5' 3\" (1.6 m). Weight as of 12/17/21: 150 lb 12.8 oz (68.4 kg).     [INSTRUCTIONS:  \"[x]\" Indicates a positive item  \"[]\" Indicates a negative item  -- DELETE ALL ITEMS NOT EXAMINED]    Constitutional: [x] Appears well-developed and well-nourished [x] No apparent distress      [] Abnormal -     Mental status: [x] Alert and awake  [x] Oriented to person/place/time [x] Able to follow commands    [] Abnormal -     Eyes:   EOM    [x]  Normal    [] Abnormal -   Sclera  [x]  Normal    [] Abnormal -          Discharge [x]  None visible   [] Abnormal -     HENT: [x] Normocephalic, atraumatic  [] Abnormal -   [x] Mouth/Throat: Mucous membranes are moist    External Ears [x] Normal  [] Abnormal -    Neck: [x] No visualized mass [] Abnormal -     Pulmonary/Chest: [x] Respiratory effort normal   [x] No visualized signs of difficulty breathing or respiratory distress        [] Abnormal -      Musculoskeletal:   [x] Normal gait with no signs of ataxia         [x] Normal range of motion of neck        [] Abnormal -     Neurological:        [x] No Facial Asymmetry (Cranial nerve 7 motor function) (limited exam due to video visit)          [x] No gaze palsy        [] Abnormal -          Skin:        [x] No significant exanthematous lesions or discoloration noted on facial skin         [] Abnormal -            Psychiatric:       [x] Normal Affect [] Abnormal -        [x] No Hallucinations    Other pertinent observable physical exam findings:-          The patient was evaluated through a synchronous (real-time) audio-video encounter. The patient (or guardian if applicable) is aware that this is a billable service. Verbal consent to proceed has been obtained within the past 12 months. The visit was conducted pursuant to the emergency declaration under the [address] [place] [address] and the [employer] and [employer] General Act. Patient identification was verified, and a caregiver was present when appropriate. The patient was located in a state where the provider was credentialed to provide care.     Helena Del Cid, DO    This dictation was generated by voice recognition computer software. Although all attempts are made to edit the dictation for accuracy, there may be errors in the transcription that are not intended.

## 2022-03-05 DIAGNOSIS — R35.0 URINARY FREQUENCY: ICD-10-CM

## 2022-03-05 DIAGNOSIS — R10.9 FLANK PAIN: ICD-10-CM

## 2022-03-05 LAB
A/G RATIO: 1.7 (ref 1.1–2.2)
ALBUMIN SERPL-MCNC: 4.3 G/DL (ref 3.4–5)
ALP BLD-CCNC: 45 U/L (ref 40–129)
ALT SERPL-CCNC: 22 U/L (ref 10–40)
ANION GAP SERPL CALCULATED.3IONS-SCNC: 13 MMOL/L (ref 3–16)
AST SERPL-CCNC: 17 U/L (ref 15–37)
BASOPHILS ABSOLUTE: 0 K/UL (ref 0–0.2)
BASOPHILS RELATIVE PERCENT: 0.5 %
BILIRUB SERPL-MCNC: 0.4 MG/DL (ref 0–1)
BILIRUBIN URINE: NEGATIVE
BLOOD, URINE: NEGATIVE
BUN BLDV-MCNC: 11 MG/DL (ref 7–20)
CALCIUM SERPL-MCNC: 9.2 MG/DL (ref 8.3–10.6)
CHLORIDE BLD-SCNC: 105 MMOL/L (ref 99–110)
CLARITY: CLEAR
CO2: 21 MMOL/L (ref 21–32)
COLOR: YELLOW
CREAT SERPL-MCNC: 0.9 MG/DL (ref 0.6–1.1)
EOSINOPHILS ABSOLUTE: 0 K/UL (ref 0–0.6)
EOSINOPHILS RELATIVE PERCENT: 1 %
EPITHELIAL CELLS, UA: 4 /HPF (ref 0–5)
GFR AFRICAN AMERICAN: >60
GFR NON-AFRICAN AMERICAN: >60
GLUCOSE BLD-MCNC: 81 MG/DL (ref 70–99)
GLUCOSE URINE: NEGATIVE MG/DL
HCG(URINE) PREGNANCY TEST: NEGATIVE
HCT VFR BLD CALC: 39.5 % (ref 36–48)
HEMOGLOBIN: 13.4 G/DL (ref 12–16)
HYALINE CASTS: 5 /LPF (ref 0–8)
KETONES, URINE: NEGATIVE MG/DL
LEUKOCYTE ESTERASE, URINE: NEGATIVE
LYMPHOCYTES ABSOLUTE: 2.1 K/UL (ref 1–5.1)
LYMPHOCYTES RELATIVE PERCENT: 41.2 %
MCH RBC QN AUTO: 29.3 PG (ref 26–34)
MCHC RBC AUTO-ENTMCNC: 33.9 G/DL (ref 31–36)
MCV RBC AUTO: 86.5 FL (ref 80–100)
MICROSCOPIC EXAMINATION: ABNORMAL
MONOCYTES ABSOLUTE: 0.3 K/UL (ref 0–1.3)
MONOCYTES RELATIVE PERCENT: 6.4 %
NEUTROPHILS ABSOLUTE: 2.5 K/UL (ref 1.7–7.7)
NEUTROPHILS RELATIVE PERCENT: 50.9 %
NITRITE, URINE: NEGATIVE
PDW BLD-RTO: 13.7 % (ref 12.4–15.4)
PH UA: 6 (ref 5–8)
PLATELET # BLD: 274 K/UL (ref 135–450)
PMV BLD AUTO: 7.8 FL (ref 5–10.5)
POTASSIUM SERPL-SCNC: 4.4 MMOL/L (ref 3.5–5.1)
PROTEIN UA: NEGATIVE MG/DL
RBC # BLD: 4.57 M/UL (ref 4–5.2)
RBC UA: 2 /HPF (ref 0–4)
SODIUM BLD-SCNC: 139 MMOL/L (ref 136–145)
SPECIFIC GRAVITY UA: 1.02 (ref 1–1.03)
TOTAL PROTEIN: 6.9 G/DL (ref 6.4–8.2)
URINE TYPE: ABNORMAL
UROBILINOGEN, URINE: 0.2 E.U./DL
WBC # BLD: 5 K/UL (ref 4–11)
WBC UA: 10 /HPF (ref 0–5)

## 2022-03-06 LAB — URINE CULTURE, ROUTINE: NORMAL

## 2022-06-01 ENCOUNTER — TELEMEDICINE (OUTPATIENT)
Dept: PRIMARY CARE CLINIC | Age: 37
End: 2022-06-01
Payer: COMMERCIAL

## 2022-06-01 ENCOUNTER — TELEPHONE (OUTPATIENT)
Dept: PRIMARY CARE CLINIC | Age: 37
End: 2022-06-01

## 2022-06-01 DIAGNOSIS — K58.0 IRRITABLE BOWEL SYNDROME WITH DIARRHEA: Primary | ICD-10-CM

## 2022-06-01 PROCEDURE — 99213 OFFICE O/P EST LOW 20 MIN: CPT | Performed by: FAMILY MEDICINE

## 2022-06-01 RX ORDER — HYOSCYAMINE SULFATE 0.12 MG/1
0.12 TABLET SUBLINGUAL EVERY 6 HOURS PRN
Qty: 60 EACH | Refills: 0 | Status: SHIPPED | OUTPATIENT
Start: 2022-06-01 | End: 2022-06-09 | Stop reason: SDUPTHER

## 2022-06-01 NOTE — TELEPHONE ENCOUNTER
----- Message from Albino Wolff MD sent at 6/1/2022 12:18 PM EDT -----  Please call patient to schedule I month follow-up

## 2022-06-01 NOTE — PATIENT INSTRUCTIONS
Patient Education        Learning About the Low FODMAP Diet for Irritable Bowel Syndrome (IBS)  What is the low-FODMAP diet? A low-FODMAP diet is used to find out if certain foods make irritable bowel syndrome (IBS) worse. You stop eating high-FODMAP foods for 2 to 6 weeks. Thenyou slowly add them back to see how your body reacts. This is called an elimination diet. A dietitian or doctor can help you followthis diet. FODMAPs are types of carbohydrates that can be hard for your body to digest.They are in many types of foods. FODMAP stands for:   F ermentable.  O ligosaccharides.  D isaccharides. [place] M onosaccharides.  A nd p olyols. If you have IBS, foods that are high in FODMAPs may make your symptoms worse. When you are on this diet, you can still eat carbohydrates that are low in FODMAPs. This includes certain fruits, vegetables, grains, and low-lactosedairy products. What is it used for? This diet is used to help manage symptoms of irritable bowel syndrome (IBS). The diet limits foods that are high in FODMAPs. High-FODMAP foods can be hard to digest. They pull more fluid into your intestines. They are also easily fermented. This can lead to bloating, bellypain, gas, and diarrhea. The low-FODMAP diet can help you figure out what foods to avoid. And it canhelp you find foods that are easier to digest.  This diet can help with IBS symptoms. But it's not a cure. You will still needto manage your condition. How does it work? At first, you won't eat any high-FODMAP foods for a few weeks. It can be helpful to work with a dietitian who is trained in the [address] when you try this diet. They can help you find recipes and FODMAP foodlists to use while you are on the diet. After 2 to 6 weeks, you will start to try high-FODMAP foods again. You will add those foods back to your diet, one at a time. Your doctor or dietitian willprobably have you wait a few days before you add each new food.   Keep a food diary. You can write down the foods you try and note how they [facility] feel. After a few weeks, you may have a better idea of what foods you should avoidand what foods you can eat without triggering IBS symptoms. What are the risks? There is some risk of not getting all of the vitamins and nutrients you need onthe low-FODMAP diet. These include:   Folate.  Thiamin.  Vitamin B6.   Calcium.  Vitamin D. Your dietitian or doctor can help you find other sources of these if needed. This diet may limit your fiber intake. If you need more fiber, ask your doctoror dietitian about low-FODMAP fiber sources. What foods are on the low-FODMAP diet? Here is a guide to foods that you can eat, plus the foods that you shouldavoid, when you are on the low-FODMAP diet. Grains  Okay to eat: Foods made from grains like arrowroot, buckwheat, cornmeal, millet, and oats. You can also eat potato flour, quinoa, rice, sorghum, tapioca, and teff. Cereals, pasta, breads, corn tortillas and baked goods made from these grainsare also okay. (These grains may be labeled \"gluten-free. \")  Avoid: Grains like wheat, barley, and rye. Avoid ingredients such as bulgur, couscous, durum, and semolina. And avoid cereals, breads, and pastas made fromthese grains. Avoid chickpea, lentil, and pea flour. Proteins  Okay to eat: Most meat, fish, and eggs without high-FODMAP sauces. You can have small amounts of almonds or hazelnuts (10 nuts). Macadamia nuts, peanuts, pecans, pine nuts, and walnuts are also okay. You can also eat marti and pumpkin seeds,tofu, and tempeh. Avoid: Beans, chickpeas, lentils, and soybeans. Avoid pistachio and cashew nuts. Avoid fatty or fried meats. And some sausages may have high-FODMAP ingredients. Dairy  Okay to eat: Lactose-free dairy milks. Rice milk and almond milk are okay. So are lactose-free yogurts, kefirs, ice creams, and sorbet from low-FODMAP fruits and sweeteners.  (These are often labeled \"lactose-free. \") You can have small amounts (2 Tbsp) of cottage, cream, or ricotta cheese. Hard cheeses like cheddar, Terrell, ROSS, and Swiss are okay. So are small amounts (1 oz) ofaged or ripened cheeses like Brie, blue, and feta. Avoid: Milk, including cow, goat, and sheep. Avoid condensed or evaporated milk, buttermilk, custard, cream, sour cream, yogurt, and ice cream. Avoid soy milk. (Check sauces for dairy ingredients.)  Vegetables  Okay to eat: Bamboo shoots, bell peppers, bok rosy, broccoli, cabbage (red or white), and cucumbers. Eggplant, green beans, lettuce, olives, parsnips, and potatoes are okay to eat. So are pumpkin, rutabaga, seaweed, sprouts, Swiss chard, and spinach. You can eat scallions (green part only) and yellow or spaghetti squash. You can eat tomatoes, turnips, watercress, yams, and zucchini. You can also have small amounts of artichoke hearts (from can, 1 oz), carrots, corn (½cob), and sweet potato (½ cup). Avoid: Artichokes, asparagus, [place] sprouts, elidia cabbage, cauliflower, and celery. And avoid garlic, leeks, mushrooms, okra, onions, scallions (whitepart), shallots, and peas. Fruits  Okay to eat: Bananas, blueberries, cantaloupe, grapes, and honeydew. Kiwi, valentino, limes, oranges, passion fruit, papaya, and pineapple are also okay. You can eat plantain, raspberries, rhubarb, star fruit, strawberries, tangelo, and tangerine. You can also have small amounts of dried banana chips (up to 10chips), dried cranberries (1 Tbsp), and shredded coconut (up to ¼ cup). Avoid: Apples, applesauce, apricots, avocados, blackberries, boysenberries, and cherries. Also avoid dates, figs, grapefruit, guava, lychee, and mangoes. Don't eat nectarines, peaches, pears, persimmon, plums, prunes, tamarillo, orwatermelon. And limit most canned and dried fruits. Oils, spices, condiments, and sweeteners  Okay to eat: Vegetable oils (including garlic infused), butter, ghee, lard, and margarine.  You can have most fresh herbs like basil, chives, coriander, olga, parsley, rosemary, and thyme. You can have salt, jams made from low-FODMAP fruits, mayonnaise, and mustard. Soy sauce, hot sauce (no garlic), tamari, and vinegar are also okay. Sweeteners that are okay include sugar (sucrose), powdered (confectioner's) sugar, brown sugar, glucose, and maple syrup. You can alsohave some artificial sweeteners like aspartame, saccharine, and stevia. Avoid: Chutneys, hummus, jellies, garlic sauces, and gravies made with onion or garlic. Avoid pickles, relish, some salad dressings and soup stocks, salsa, and tomato paste. And avoid sauces and other foods with high fructose corn syrup, honey, molasses, and agave. Avoid artificial sweeteners (isomalt, mannitol, malitol, sorbitol, and xylitol). Avoid corn syrup solids, fructose, fruit juiceconcentrate, and polydextrose. Other foods and drinks  Okay to have: Water, soda water, tonic, soft drinks sweetened with sugar, ½ cup of low-FODMAP fruit juice, and most teas and alcohols. You can also eat foods madewith baking powder and soda, cocoa, and gelatin. Avoid: Juices from high-FODMAP fruits and vegetables. And avoid fortified nina, chamomile and fennel teas, chicory-based drinks and coffee substitutes, andbouillon cubes. Follow-up care is a key part of your treatment and safety. Be sure to make and go to all appointments, and call your doctor if you are having problems. It's also a good idea to know your test results and keep alist of the medicines you take. Where can you learn more? Go to https://brenden."[employer] org and sign in to your [employer] account. Enter L235 in the Syntax[employer] box to learn more about \"Learning About the Low FODMAP Diet for Irritable Bowel Syndrome (IBS). \"     If you do not have an account, please click on the \"Sign Up Now\" link.   Current as of: September 8, 2021               Content Version: 13.2  © 6182-5594 [employer]wise, Incorporated. Care instructions adapted under license by [facility] ([facility]). If you have questions about a medical condition or this instruction, always ask your healthcare professional. [address] any warranty or liability for your use of this information.

## 2022-06-01 NOTE — PROGRESS NOTES
2022       TELEHEALTH EVALUATION -- Audio/Visual (During HRCRV-76 public health emergency)    HPI:    Chuck Oleary (:  1985) has requested an audio/video evaluation for the following concern(s): Nervous belly    Lexie Hilton has had a chronic history of a nervous belly characterized by issues of frequent diarrhea, fatigue, crampy abdominal pain associated with bowel movements and nausea and vomiting. She notes the last couple weeks have been particularly difficult. She also notes increased flatulence and patches of red skin on her neck and forehead. Review of Systems    Prior to Visit Medications    Medication Sig Taking?  Authorizing Provider   rifAXIMin (XIFAXAN) 550 MG tablet Take 1 tablet by mouth 3 times daily for 14 days Yes Luz Maria Cisneros MD   Hyoscyamine Sulfate SL (LEVSIN/SL) 0.125 MG SUBL Take 0.125 mg by mouth every 6 hours as needed (abd pain) Yes Luz Maria Cisneros MD   KARIVA 0.15-0.02/0.01 MG () per tablet   Historical Provider, MD   naproxen (NAPROSYN) 500 MG tablet Take 1 tablet by mouth 2 times daily (with meals) for 14 days  Jono QuanDO   Cranberry, Vacc oxycoccus, (CRANBERRY EXTRACT) 200 MG CAPS Take 1 tablet by mouth daily  Historical Provider, MD       Social History     Tobacco Use    Smoking status: Never Smoker    Smokeless tobacco: Never Used   Vaping Use    Vaping Use: Never used   Substance Use Topics    Alcohol use: Not Currently    Drug use: Yes     Types: Marijuana (Weed)        Allergies   Allergen Reactions    Amoxicillin Rash    Penicillin G Rash    Penicillins Rash   ,   Past Medical History:   Diagnosis Date    Amenorrhea 3/27/2017    Anemia     Anxiety     no meds    Bilateral back pain 2021    Biological false positive RPR test 2020    Initial RPR neg at 15wks, then pos at 27wks with neg treponema    Depression     no meds    Female fertility problem 2020    Pregnancy via insemination (IUI) per DEJAN    Gestational hypertension, third trimester 2020    History of infertility     IUI x3    Kallman syndrome ([address]     Obesity complicating pregnancy in third trimester 2021    Poor fetal growth affecting management of mother in third trimester 2020    Rh negative status during pregnancy in second trimester 2020    Supervision of other high risk pregnancies, unspecified trimester 2021   ,   Past Surgical History:   Procedure Laterality Date     SECTION N/A 2020     SECTION performed by Paco Pillai MD at [address] [place] [address] L&D OR       PHYSICAL EXAMINATION:  There were no vitals taken for this visit. Wt Readings from Last 3 Encounters:   21 150 lb 12.8 oz (68.4 kg)   20 180 lb (81.6 kg)   20 130 lb (59 kg)       [ INSTRUCTIONS:  \"[x]\" Indicates a positive item  \"[]\" Indicates a negative item  -- DELETE ALL ITEMS NOT EXAMINED]  [x] Alert  [x] Oriented to person/place/time    [x] No apparent distress  []  Appears Unwell:     [x] Breathing appears normal  [] Appears tachypneic      [] Rash on visible skin:    [] Cranial Nerves II-XII grossly intact    [] Motor grossly intact in visible upper extremities    [] Motor grossly intact in visible lower extremities    [] Normal Mood  [] Anxious appearing    [] Depressed appearing     [] OTHER:      Due to this being a TeleHealth encounter, evaluation of the following organ systems is limited: Vitals/Constitutional/EENT/Resp/CV/GI//MS/Neuro/Skin/Heme-Lymph-Imm.   Lab Results   Component Value Date     2022    K 4.4 2022     2022    CO2 21 2022    BUN 11 2022    CREATININE 0.9 2022    GLUCOSE 81 2022    CALCIUM 9.2 2022    PROT 6.9 2022    LABALBU 4.3 2022    BILITOT 0.4 2022    ALKPHOS 45 2022    AST 17 2022    ALT 22 2022    LABGLOM >60 2022    GFRAA >60 2022    AGRATIO 1.7 2022    GLOB 2.7 2020     No results found for: LABA1C  No results found for: EAG      ASSESSMENT/PLAN:  1. Irritable bowel syndrome with diarrhea  We will trial low FODMAP diet, Levsin and Xifaxan and have patient follow-up with us in 1 month. - rifAXIMin (XIFAXAN) 550 MG tablet; Take 1 tablet by mouth 3 times daily for 14 days  Dispense: 42 tablet; Refill: 0  - Hyoscyamine Sulfate SL (LEVSIN/SL) 0.125 MG SUBL; Take 0.125 mg by mouth every 6 hours as needed (abd pain)  Dispense: 60 each; Refill: 0      Return in about 1 month (around 7/1/2022). Boubacar Thompson, was evaluated through a synchronous (real-time) audio-video encounter. The patient (or guardian if applicable) is aware that this is a billable service, which includes applicable co-pays. This Virtual Visit was conducted with patient's (and/or legal guardian's) consent. The visit was conducted pursuant to the emergency declaration under the [address] [place] [address], [address] [place] [address] authority and the [employer] Act. Patient identification was verified, and a caregiver was present when appropriate. The patient was located at Home: [facility]. Provider was located at [facility] (Appt Dept): [address] [place] [address]. Total time spent for this encounter: Not billed by time    --Barrera Milner MD on 6/1/2022 at 12:18 PM    An electronic signature was used to authenticate this note.   An  electronic signature was used to authenticate this note.    --Barrera Milner MD on 6/1/2022 at 12:18 PM        Pursuant to the emergency declaration under the [address] [place] [address], [facility] authority and the [employer] and [employer] Act, this Virtual  Visit was conducted, with patient's consent, to reduce the patient's risk of exposure to COVID-19 and provide continuity of care for an established patient. Services were provided through a video synchronous discussion virtually to substitute for in-person clinic visit.

## 2022-06-02 ENCOUNTER — TELEPHONE (OUTPATIENT)
Dept: ORTHOPEDIC SURGERY | Age: 37
End: 2022-06-02

## 2022-06-02 NOTE — TELEPHONE ENCOUNTER
Submitted PA for Xifaxan 550MG tablets  Via CMM Key: [id number] STATUS: PENDING    [employer] wants to know if patient has tried and failed TWO of the following preferred drugs; Dicyclomine, Diphenoxylate/Atropine, and Loperamide for 14 days. Please respond to the pool ( P [facility] [address] [facility]). Thank you!

## 2022-06-03 ENCOUNTER — TELEPHONE (OUTPATIENT)
Dept: PRIMARY CARE CLINIC | Age: 37
End: 2022-06-03

## 2022-06-03 NOTE — TELEPHONE ENCOUNTER
Insurance did not wait for our response; they already denied Xifaxan. Denial letter attached. If patient has taken any of those medication, please let us know and we can do a resubmission or an appeal.    If this requires a response please respond to the pool ( P [facility] [address]). Thank you please advise patient.

## 2022-06-03 NOTE — TELEPHONE ENCOUNTER
rifAXIMin (XIFAXAN) 550 MG tablet [[id number]]   Order Details   Dose: 550 mg Route: Oral Frequency: 3 TIMES DAILY   Dispense Quantity: 42 tablet Refills: 0         Sig: Take 1 tablet by mouth 3 times daily for 14 days        Start Date: 06/01/22 End Date: 06/15/22 after 42 doses   Written Date: 06/01/22 Expiration Date: 06/01/23      Diagnosis Association: Irritable bowel syndrome with diarrhea (K58.0)   Providers  Authorizing Provider: Dakotah Mendez MD NPI: [id number]   Ordering User: Dakotah Mendez MD        Pharmacy  [place] [id number], . Juliette [address] [phone]   [facility] [address], [address] [address]   Phone: [phone] Fax: [phone]

## 2022-06-09 ENCOUNTER — TELEPHONE (OUTPATIENT)
Dept: PRIMARY CARE CLINIC | Age: 37
End: 2022-06-09

## 2022-06-09 DIAGNOSIS — K58.0 IRRITABLE BOWEL SYNDROME WITH DIARRHEA: ICD-10-CM

## 2022-06-09 RX ORDER — HYOSCYAMINE SULFATE 0.12 MG/1
0.12 TABLET SUBLINGUAL EVERY 6 HOURS PRN
Qty: 60 EACH | Refills: 0 | Status: SHIPPED | OUTPATIENT
Start: 2022-06-09

## 2022-06-09 NOTE — TELEPHONE ENCOUNTER
Medication:   Requested Prescriptions     Pending Prescriptions Disp Refills    Hyoscyamine Sulfate SL (LEVSIN/SL) 0.125 MG SUBL 60 each 0     Sig: Take 0.125 mg by mouth every 6 hours as needed (abd pain)        Last Filled:      Patient Phone Number: [phone] (home)     Last appt: 6/1/2022   Next appt: 7/1/2022  Return in about 4 weeks (around 1/14/2022) for annual physical.    Last OARRS: No flowsheet data found.

## 2022-06-10 NOTE — TELEPHONE ENCOUNTER
Pt  called in stating pt was prescribed rifAXIMin (XIFAXAN) 550 MG tablet by . Looking into pt chart a pa was needed. Pt  said pharmacy told them it was denied. We have yet to get a respond from the insurance company.  Can you check in with the pa department to see if there has been an update

## 2022-06-10 NOTE — TELEPHONE ENCOUNTER
To the patient use the Xifaxan coupon card? It is typically denied when submitted through insurance.

## 2022-06-10 NOTE — TELEPHONE ENCOUNTER
Pt was seen by Art Clemons on 06/3 for the following, has had a chronic history of a nervous belly characterized by issues of frequent diarrhea, fatigue, crampy abdominal pain associated with bowel movements and nausea and vomiting. She notes the last couple weeks have been particularly difficult. She also notes increased flatulence and patches of red skin on her neck and forehead.  prescribed her Xifaxan 550mg tablets.  Pa was subbmited and denied stating

## 2022-06-10 NOTE — TELEPHONE ENCOUNTER
PA COVER MY MEDS  Medication:Hyoscyamine Sulfate 0.125MG sublingual tablets  Key: [id number] - Rx #: [id number]  Status:PENDING

## 2022-06-13 ENCOUNTER — TELEPHONE (OUTPATIENT)
Dept: PRIMARY CARE CLINIC | Age: 37
End: 2022-06-13

## 2022-06-13 NOTE — TELEPHONE ENCOUNTER
Received DENIAL for Hyoscyamine Sulfate 0.125MG sublingual tablets. Denial letter attached. If this requires a response please respond to the pool ( [facility] [address]). Thank you please advise patient.

## 2022-06-13 NOTE — TELEPHONE ENCOUNTER
rifAXIMin (XIFAXAN) 550 MG tablet [[id number]]     Order Details  Dose: 550 mg Route: Oral Frequency: 3 TIMES DAILY   Dispense Quantity: 42 tablet Refills: 0          Sig: Take 1 tablet by mouth 3 times daily for 14 days         Start Date: 06/01/22 End Date: 06/15/22 after 42 doses   Written Date: 06/01/22 Expiration Date: 06/01/23       Diagnosis Association: Irritable bowel syndrome with diarrhea (K58.0)     Providers    Authorizing Provider: Elzbieta Rivera MD NPI: [id number]   Ordering User:  Elzbieta Rivera MD

## 2022-06-13 NOTE — TELEPHONE ENCOUNTER
Pt stated prescriptions should be ran through [facility] that is what their insurance runs prescriptions throw.

## 2022-06-14 NOTE — TELEPHONE ENCOUNTER
Called [facility] in [employer] and spoke to Victorina Rosen who provided me with the [place] used for this PA submission. Submitted PA for Xifaxan 550MG tablets  [employer]ia [employer] Key: [id number] STATUS: APPROVED through 06/28/2022; Approval letter attached. If this requires a response please respond to the pool ( [facility] [address] [facility]). Thank you please advise patient.

## 2022-07-29 ENCOUNTER — TELEPHONE (OUTPATIENT)
Dept: PRIMARY CARE CLINIC | Age: 37
End: 2022-07-29

## 2022-07-29 NOTE — TELEPHONE ENCOUNTER
Pts  would like a referral sent for an endo doctor in [place] she can see any doctor in practice  fax to [phone]

## 2022-08-03 ENCOUNTER — TELEPHONE (OUTPATIENT)
Dept: PRIMARY CARE CLINIC | Age: 37
End: 2022-08-03

## 2022-08-03 NOTE — TELEPHONE ENCOUNTER
Pt called in regards  to voice mail that was left yesterday in regards to her needing an appointment for a referral to Endo. I tried to explain to pt why she is in need of an appointment for this. Pt then went on a rant using kavon to yelling at me. I then told patient that she did not have to use those type of words. I asked pt if she would allow me to explain to her the way that everything work she would have a better understanding as to why an appointment is needed. Pt then yelled again using lots  kavon to hung up on me.

## (undated) DEVICE — CHLORAPREP 26ML ORANGE

## (undated) DEVICE — SUTURE VCRL SZ 0 L36IN ABSRB UD L36MM CT-1 1/2 CIR J946H

## (undated) DEVICE — SUTURE VCRL SZ 3-0 L36IN ABSRB UD L36MM CT-1 1/2 CIR J944H

## (undated) DEVICE — SUTURE VCRL SZ 4-0 L27IN ABSRB UD L60MM KS STR REV CUT NDL J662H

## (undated) DEVICE — S/USE RESUS KIT W/O MASK (10): Brand: FISHER & PAYKEL HEALTHCARE

## (undated) DEVICE — Device

## (undated) DEVICE — SUTURE MCRYL SZ 0 L36IN ABSRB VLT CT L40MM 1/2 CIR TAPR PNT Y358H

## (undated) DEVICE — 3M™ STERI-STRIP™ COMPOUND BENZOIN TINCTURE 40 BAGS/CARTON 4 CARTONS/CASE C1544: Brand: 3M™ STERI-STRIP™

## (undated) DEVICE — GLOVE ORANGE PI 7 1/2   MSG9075

## (undated) DEVICE — DRESSING COMP IS W4XL10IN PD W2XL8IN CNTCT LAYR ADH

## (undated) DEVICE — SOLUTION IV IRRIG POUR BRL 0.9% SODIUM CHL 2F7124

## (undated) DEVICE — GARMENT COMPR L FOR 23IN CALF FLOTRN

## (undated) DEVICE — TRAY URIN CATH 16FR DRNGE BG STATLOK STBL DEV F SURSTP

## (undated) DEVICE — PAD,NON-ADHERENT,3X8,STERILE,LF,1/PK: Brand: MEDLINE